# Patient Record
Sex: FEMALE | Race: WHITE | NOT HISPANIC OR LATINO | Employment: OTHER | URBAN - METROPOLITAN AREA
[De-identification: names, ages, dates, MRNs, and addresses within clinical notes are randomized per-mention and may not be internally consistent; named-entity substitution may affect disease eponyms.]

---

## 2017-01-27 ENCOUNTER — AMBULATORY - HEALTHEAST (OUTPATIENT)
Dept: HEALTH INFORMATION MANAGEMENT | Facility: CLINIC | Age: 82
End: 2017-01-27

## 2017-09-19 ENCOUNTER — RECORDS - HEALTHEAST (OUTPATIENT)
Dept: LAB | Facility: CLINIC | Age: 82
End: 2017-09-19

## 2017-09-19 LAB
CHOLEST SERPL-MCNC: 176 MG/DL
FASTING STATUS PATIENT QL REPORTED: ABNORMAL
HDLC SERPL-MCNC: 60 MG/DL
LDLC SERPL CALC-MCNC: 76 MG/DL
TRIGL SERPL-MCNC: 199 MG/DL

## 2018-06-15 ENCOUNTER — RECORDS - HEALTHEAST (OUTPATIENT)
Dept: LAB | Facility: CLINIC | Age: 83
End: 2018-06-15

## 2018-06-15 LAB — URATE SERPL-MCNC: 4.8 MG/DL (ref 2–7.5)

## 2019-03-18 ENCOUNTER — RECORDS - HEALTHEAST (OUTPATIENT)
Dept: LAB | Facility: CLINIC | Age: 84
End: 2019-03-18

## 2019-03-18 LAB
ANION GAP SERPL CALCULATED.3IONS-SCNC: 7 MMOL/L (ref 5–18)
BUN SERPL-MCNC: 19 MG/DL (ref 8–28)
CALCIUM SERPL-MCNC: 9.3 MG/DL (ref 8.5–10.5)
CHLORIDE BLD-SCNC: 106 MMOL/L (ref 98–107)
CHOLEST SERPL-MCNC: 175 MG/DL
CO2 SERPL-SCNC: 25 MMOL/L (ref 22–31)
CREAT SERPL-MCNC: 0.8 MG/DL (ref 0.6–1.1)
FASTING STATUS PATIENT QL REPORTED: ABNORMAL
GFR SERPL CREATININE-BSD FRML MDRD: >60 ML/MIN/1.73M2
GLUCOSE BLD-MCNC: 102 MG/DL (ref 70–125)
HDLC SERPL-MCNC: 59 MG/DL
LDLC SERPL CALC-MCNC: 82 MG/DL
POTASSIUM BLD-SCNC: 3.7 MMOL/L (ref 3.5–5)
SODIUM SERPL-SCNC: 138 MMOL/L (ref 136–145)
TRIGL SERPL-MCNC: 168 MG/DL

## 2019-04-05 ENCOUNTER — AMBULATORY - HEALTHEAST (OUTPATIENT)
Dept: NEUROLOGY | Facility: CLINIC | Age: 84
End: 2019-04-05

## 2019-04-05 DIAGNOSIS — G20.A1 PARKINSON DISEASE (H): ICD-10-CM

## 2019-05-23 ENCOUNTER — RECORDS - HEALTHEAST (OUTPATIENT)
Dept: ADMINISTRATIVE | Facility: OTHER | Age: 84
End: 2019-05-23

## 2019-05-23 ENCOUNTER — HOSPITAL ENCOUNTER (OUTPATIENT)
Dept: OCCUPATIONAL THERAPY | Age: 84
Setting detail: THERAPIES SERIES
Discharge: STILL ADMITTED OR ADMINISTRATIVE | End: 2019-05-23
Attending: PSYCHIATRY & NEUROLOGY

## 2019-05-23 ENCOUNTER — HOSPITAL ENCOUNTER (OUTPATIENT)
Dept: SPEECH THERAPY | Age: 84
Setting detail: THERAPIES SERIES
Discharge: STILL ADMITTED OR ADMINISTRATIVE | End: 2019-05-23
Attending: PSYCHIATRY & NEUROLOGY

## 2019-05-23 ENCOUNTER — HOSPITAL ENCOUNTER (OUTPATIENT)
Dept: PHYSICAL THERAPY | Age: 84
Setting detail: THERAPIES SERIES
Discharge: STILL ADMITTED OR ADMINISTRATIVE | End: 2019-05-23
Attending: PSYCHIATRY & NEUROLOGY

## 2019-05-23 DIAGNOSIS — Z78.9 IMPAIRED MOBILITY AND ADLS: ICD-10-CM

## 2019-05-23 DIAGNOSIS — Z74.09 DECREASED FUNCTIONAL MOBILITY AND ENDURANCE: ICD-10-CM

## 2019-05-23 DIAGNOSIS — R29.898 WEAKNESS OF BOTH LOWER EXTREMITIES: ICD-10-CM

## 2019-05-23 DIAGNOSIS — R25.8 BRADYKINESIA: ICD-10-CM

## 2019-05-23 DIAGNOSIS — R29.3 POSTURAL INSTABILITY: ICD-10-CM

## 2019-05-23 DIAGNOSIS — R68.89 DECREASED ACTIVITY TOLERANCE: ICD-10-CM

## 2019-05-23 DIAGNOSIS — R49.0 HYPOKINETIC PARKINSONIAN DYSPHONIA (H): ICD-10-CM

## 2019-05-23 DIAGNOSIS — R41.89 COGNITIVE IMPAIRMENT: ICD-10-CM

## 2019-05-23 DIAGNOSIS — G20.A1 HYPOKINETIC PARKINSONIAN DYSPHONIA (H): ICD-10-CM

## 2019-05-23 DIAGNOSIS — R26.81 GAIT INSTABILITY: ICD-10-CM

## 2019-05-23 DIAGNOSIS — R25.1 TREMOR: ICD-10-CM

## 2019-05-23 DIAGNOSIS — R27.9 LACK OF COORDINATION: ICD-10-CM

## 2019-05-23 DIAGNOSIS — Z74.09 IMPAIRED MOBILITY AND ADLS: ICD-10-CM

## 2019-06-04 ENCOUNTER — HOSPITAL ENCOUNTER (OUTPATIENT)
Dept: PHYSICAL THERAPY | Age: 84
Setting detail: THERAPIES SERIES
Discharge: STILL ADMITTED OR ADMINISTRATIVE | End: 2019-06-04
Attending: PSYCHIATRY & NEUROLOGY

## 2019-06-04 ENCOUNTER — HOSPITAL ENCOUNTER (OUTPATIENT)
Dept: SPEECH THERAPY | Age: 84
Setting detail: THERAPIES SERIES
Discharge: STILL ADMITTED OR ADMINISTRATIVE | End: 2019-06-04
Attending: PSYCHIATRY & NEUROLOGY

## 2019-06-04 DIAGNOSIS — R29.898 WEAKNESS OF BOTH LOWER EXTREMITIES: ICD-10-CM

## 2019-06-04 DIAGNOSIS — R29.3 POSTURAL INSTABILITY: ICD-10-CM

## 2019-06-04 DIAGNOSIS — G20.A1 HYPOKINETIC PARKINSONIAN DYSPHONIA (H): ICD-10-CM

## 2019-06-04 DIAGNOSIS — Z74.09 DECREASED FUNCTIONAL MOBILITY AND ENDURANCE: ICD-10-CM

## 2019-06-04 DIAGNOSIS — R25.8 BRADYKINESIA: ICD-10-CM

## 2019-06-04 DIAGNOSIS — R26.81 GAIT INSTABILITY: ICD-10-CM

## 2019-06-04 DIAGNOSIS — R49.0 HYPOKINETIC PARKINSONIAN DYSPHONIA (H): ICD-10-CM

## 2019-06-07 ENCOUNTER — HOSPITAL ENCOUNTER (OUTPATIENT)
Dept: SPEECH THERAPY | Age: 84
Setting detail: THERAPIES SERIES
Discharge: STILL ADMITTED OR ADMINISTRATIVE | End: 2019-06-07
Attending: PSYCHIATRY & NEUROLOGY

## 2019-06-07 ENCOUNTER — HOSPITAL ENCOUNTER (OUTPATIENT)
Dept: PHYSICAL THERAPY | Age: 84
Setting detail: THERAPIES SERIES
Discharge: STILL ADMITTED OR ADMINISTRATIVE | End: 2019-06-07
Attending: PSYCHIATRY & NEUROLOGY

## 2019-06-07 DIAGNOSIS — Z74.09 DECREASED FUNCTIONAL MOBILITY AND ENDURANCE: ICD-10-CM

## 2019-06-07 DIAGNOSIS — R29.898 WEAKNESS OF BOTH LOWER EXTREMITIES: ICD-10-CM

## 2019-06-07 DIAGNOSIS — G20.A1 HYPOKINETIC PARKINSONIAN DYSPHONIA (H): ICD-10-CM

## 2019-06-07 DIAGNOSIS — R26.81 GAIT INSTABILITY: ICD-10-CM

## 2019-06-07 DIAGNOSIS — R49.0 HYPOKINETIC PARKINSONIAN DYSPHONIA (H): ICD-10-CM

## 2019-06-07 DIAGNOSIS — R25.8 BRADYKINESIA: ICD-10-CM

## 2019-06-07 DIAGNOSIS — R29.3 POSTURAL INSTABILITY: ICD-10-CM

## 2019-06-11 ENCOUNTER — HOSPITAL ENCOUNTER (OUTPATIENT)
Dept: PHYSICAL THERAPY | Age: 84
Setting detail: THERAPIES SERIES
Discharge: STILL ADMITTED OR ADMINISTRATIVE | End: 2019-06-11
Attending: PSYCHIATRY & NEUROLOGY

## 2019-06-11 ENCOUNTER — HOSPITAL ENCOUNTER (OUTPATIENT)
Dept: SPEECH THERAPY | Age: 84
Setting detail: THERAPIES SERIES
Discharge: STILL ADMITTED OR ADMINISTRATIVE | End: 2019-06-11
Attending: PSYCHIATRY & NEUROLOGY

## 2019-06-11 DIAGNOSIS — R26.81 GAIT INSTABILITY: ICD-10-CM

## 2019-06-11 DIAGNOSIS — R29.3 POSTURAL INSTABILITY: ICD-10-CM

## 2019-06-11 DIAGNOSIS — Z74.09 DECREASED FUNCTIONAL MOBILITY AND ENDURANCE: ICD-10-CM

## 2019-06-11 DIAGNOSIS — R49.0 HYPOKINETIC PARKINSONIAN DYSPHONIA (H): ICD-10-CM

## 2019-06-11 DIAGNOSIS — G20.A1 HYPOKINETIC PARKINSONIAN DYSPHONIA (H): ICD-10-CM

## 2019-06-11 DIAGNOSIS — R25.8 BRADYKINESIA: ICD-10-CM

## 2019-06-18 ENCOUNTER — HOSPITAL ENCOUNTER (OUTPATIENT)
Dept: SPEECH THERAPY | Age: 84
Setting detail: THERAPIES SERIES
Discharge: STILL ADMITTED OR ADMINISTRATIVE | End: 2019-06-18
Attending: PSYCHIATRY & NEUROLOGY

## 2019-06-18 ENCOUNTER — HOSPITAL ENCOUNTER (OUTPATIENT)
Dept: PHYSICAL THERAPY | Age: 84
Setting detail: THERAPIES SERIES
Discharge: STILL ADMITTED OR ADMINISTRATIVE | End: 2019-06-18
Attending: PSYCHIATRY & NEUROLOGY

## 2019-06-18 DIAGNOSIS — Z74.09 DECREASED FUNCTIONAL MOBILITY AND ENDURANCE: ICD-10-CM

## 2019-06-18 DIAGNOSIS — R49.0 HYPOKINETIC PARKINSONIAN DYSPHONIA (H): ICD-10-CM

## 2019-06-18 DIAGNOSIS — G20.A1 HYPOKINETIC PARKINSONIAN DYSPHONIA (H): ICD-10-CM

## 2019-06-18 DIAGNOSIS — R25.8 BRADYKINESIA: ICD-10-CM

## 2019-06-18 DIAGNOSIS — R29.898 WEAKNESS OF BOTH LOWER EXTREMITIES: ICD-10-CM

## 2019-06-19 ENCOUNTER — HOSPITAL ENCOUNTER (OUTPATIENT)
Dept: PHYSICAL THERAPY | Age: 84
Setting detail: THERAPIES SERIES
Discharge: STILL ADMITTED OR ADMINISTRATIVE | End: 2019-06-19
Attending: PSYCHIATRY & NEUROLOGY

## 2019-06-19 DIAGNOSIS — R25.8 BRADYKINESIA: ICD-10-CM

## 2019-06-19 DIAGNOSIS — R29.3 POSTURAL INSTABILITY: ICD-10-CM

## 2019-06-19 DIAGNOSIS — R26.81 GAIT INSTABILITY: ICD-10-CM

## 2019-06-21 ENCOUNTER — HOSPITAL ENCOUNTER (OUTPATIENT)
Dept: SPEECH THERAPY | Age: 84
Setting detail: THERAPIES SERIES
Discharge: STILL ADMITTED OR ADMINISTRATIVE | End: 2019-06-21
Attending: PSYCHIATRY & NEUROLOGY

## 2019-06-21 DIAGNOSIS — R49.0 HYPOKINETIC PARKINSONIAN DYSPHONIA (H): ICD-10-CM

## 2019-06-21 DIAGNOSIS — G20.A1 HYPOKINETIC PARKINSONIAN DYSPHONIA (H): ICD-10-CM

## 2019-06-25 ENCOUNTER — HOSPITAL ENCOUNTER (OUTPATIENT)
Dept: PHYSICAL THERAPY | Age: 84
Setting detail: THERAPIES SERIES
Discharge: STILL ADMITTED OR ADMINISTRATIVE | End: 2019-06-25
Attending: PSYCHIATRY & NEUROLOGY

## 2019-06-25 ENCOUNTER — HOSPITAL ENCOUNTER (OUTPATIENT)
Dept: SPEECH THERAPY | Age: 84
Setting detail: THERAPIES SERIES
Discharge: STILL ADMITTED OR ADMINISTRATIVE | End: 2019-06-25
Attending: PSYCHIATRY & NEUROLOGY

## 2019-06-25 DIAGNOSIS — R29.3 POSTURAL INSTABILITY: ICD-10-CM

## 2019-06-25 DIAGNOSIS — Z74.09 DECREASED FUNCTIONAL MOBILITY AND ENDURANCE: ICD-10-CM

## 2019-06-25 DIAGNOSIS — R49.0 HYPOKINETIC PARKINSONIAN DYSPHONIA (H): ICD-10-CM

## 2019-06-25 DIAGNOSIS — R29.898 WEAKNESS OF BOTH LOWER EXTREMITIES: ICD-10-CM

## 2019-06-25 DIAGNOSIS — R25.8 BRADYKINESIA: ICD-10-CM

## 2019-06-25 DIAGNOSIS — G20.A1 HYPOKINETIC PARKINSONIAN DYSPHONIA (H): ICD-10-CM

## 2019-06-25 DIAGNOSIS — R26.81 GAIT INSTABILITY: ICD-10-CM

## 2019-06-27 ENCOUNTER — HOSPITAL ENCOUNTER (OUTPATIENT)
Dept: PHYSICAL THERAPY | Age: 84
Setting detail: THERAPIES SERIES
Discharge: STILL ADMITTED OR ADMINISTRATIVE | End: 2019-06-27
Attending: PSYCHIATRY & NEUROLOGY

## 2019-06-27 DIAGNOSIS — R29.3 POSTURAL INSTABILITY: ICD-10-CM

## 2019-06-27 DIAGNOSIS — R25.8 BRADYKINESIA: ICD-10-CM

## 2019-06-27 DIAGNOSIS — R29.898 WEAKNESS OF BOTH LOWER EXTREMITIES: ICD-10-CM

## 2019-06-27 DIAGNOSIS — Z74.09 DECREASED FUNCTIONAL MOBILITY AND ENDURANCE: ICD-10-CM

## 2019-06-27 DIAGNOSIS — R26.81 GAIT INSTABILITY: ICD-10-CM

## 2019-07-02 ENCOUNTER — HOSPITAL ENCOUNTER (OUTPATIENT)
Dept: PHYSICAL THERAPY | Age: 84
Setting detail: THERAPIES SERIES
Discharge: STILL ADMITTED OR ADMINISTRATIVE | End: 2019-07-02
Attending: PSYCHIATRY & NEUROLOGY

## 2019-07-02 ENCOUNTER — HOSPITAL ENCOUNTER (OUTPATIENT)
Dept: SPEECH THERAPY | Age: 84
Setting detail: THERAPIES SERIES
Discharge: STILL ADMITTED OR ADMINISTRATIVE | End: 2019-07-02
Attending: PSYCHIATRY & NEUROLOGY

## 2019-07-02 DIAGNOSIS — Z74.09 DECREASED FUNCTIONAL MOBILITY AND ENDURANCE: ICD-10-CM

## 2019-07-02 DIAGNOSIS — R26.81 GAIT INSTABILITY: ICD-10-CM

## 2019-07-02 DIAGNOSIS — R25.8 BRADYKINESIA: ICD-10-CM

## 2019-07-02 DIAGNOSIS — G20.A1 HYPOKINETIC PARKINSONIAN DYSPHONIA (H): ICD-10-CM

## 2019-07-02 DIAGNOSIS — R29.3 POSTURAL INSTABILITY: ICD-10-CM

## 2019-07-02 DIAGNOSIS — R29.898 WEAKNESS OF BOTH LOWER EXTREMITIES: ICD-10-CM

## 2019-07-02 DIAGNOSIS — R49.0 HYPOKINETIC PARKINSONIAN DYSPHONIA (H): ICD-10-CM

## 2019-07-09 ENCOUNTER — HOSPITAL ENCOUNTER (OUTPATIENT)
Dept: SPEECH THERAPY | Age: 84
Setting detail: THERAPIES SERIES
Discharge: STILL ADMITTED OR ADMINISTRATIVE | End: 2019-07-09
Attending: PSYCHIATRY & NEUROLOGY

## 2019-07-09 DIAGNOSIS — G20.A1 HYPOKINETIC PARKINSONIAN DYSPHONIA (H): ICD-10-CM

## 2019-07-09 DIAGNOSIS — R49.0 HYPOKINETIC PARKINSONIAN DYSPHONIA (H): ICD-10-CM

## 2019-07-23 ENCOUNTER — HOSPITAL ENCOUNTER (OUTPATIENT)
Dept: PHYSICAL THERAPY | Age: 84
Setting detail: THERAPIES SERIES
Discharge: STILL ADMITTED OR ADMINISTRATIVE | End: 2019-07-23
Attending: PSYCHIATRY & NEUROLOGY

## 2019-07-23 DIAGNOSIS — R25.8 BRADYKINESIA: ICD-10-CM

## 2019-07-23 DIAGNOSIS — R29.898 WEAKNESS OF BOTH LOWER EXTREMITIES: ICD-10-CM

## 2019-07-23 DIAGNOSIS — R26.81 GAIT INSTABILITY: ICD-10-CM

## 2019-07-23 DIAGNOSIS — Z74.09 DECREASED FUNCTIONAL MOBILITY AND ENDURANCE: ICD-10-CM

## 2019-07-23 DIAGNOSIS — R29.3 POSTURAL INSTABILITY: ICD-10-CM

## 2019-08-01 ENCOUNTER — HOSPITAL ENCOUNTER (OUTPATIENT)
Dept: PHYSICAL THERAPY | Age: 84
Setting detail: THERAPIES SERIES
Discharge: STILL ADMITTED OR ADMINISTRATIVE | End: 2019-08-01
Attending: PSYCHIATRY & NEUROLOGY

## 2019-08-01 DIAGNOSIS — Z74.09 DECREASED FUNCTIONAL MOBILITY AND ENDURANCE: ICD-10-CM

## 2019-08-01 DIAGNOSIS — R25.8 BRADYKINESIA: ICD-10-CM

## 2019-08-01 DIAGNOSIS — R29.3 POSTURAL INSTABILITY: ICD-10-CM

## 2019-08-01 DIAGNOSIS — R26.81 GAIT INSTABILITY: ICD-10-CM

## 2019-08-01 DIAGNOSIS — R29.898 WEAKNESS OF BOTH LOWER EXTREMITIES: ICD-10-CM

## 2019-08-06 ENCOUNTER — HOSPITAL ENCOUNTER (OUTPATIENT)
Dept: PHYSICAL THERAPY | Age: 84
Setting detail: THERAPIES SERIES
Discharge: STILL ADMITTED OR ADMINISTRATIVE | End: 2019-08-06
Attending: PSYCHIATRY & NEUROLOGY

## 2019-08-06 DIAGNOSIS — R26.81 GAIT INSTABILITY: ICD-10-CM

## 2019-08-06 DIAGNOSIS — Z74.09 DECREASED FUNCTIONAL MOBILITY AND ENDURANCE: ICD-10-CM

## 2019-08-06 DIAGNOSIS — R29.3 POSTURAL INSTABILITY: ICD-10-CM

## 2019-08-06 DIAGNOSIS — R29.898 WEAKNESS OF BOTH LOWER EXTREMITIES: ICD-10-CM

## 2019-08-06 DIAGNOSIS — R25.8 BRADYKINESIA: ICD-10-CM

## 2019-08-13 ENCOUNTER — HOSPITAL ENCOUNTER (OUTPATIENT)
Dept: PHYSICAL THERAPY | Age: 84
Setting detail: THERAPIES SERIES
Discharge: STILL ADMITTED OR ADMINISTRATIVE | End: 2019-08-13
Attending: PSYCHIATRY & NEUROLOGY

## 2019-08-13 DIAGNOSIS — Z74.09 DECREASED FUNCTIONAL MOBILITY AND ENDURANCE: ICD-10-CM

## 2019-08-13 DIAGNOSIS — R25.8 BRADYKINESIA: ICD-10-CM

## 2019-08-13 DIAGNOSIS — R29.3 POSTURAL INSTABILITY: ICD-10-CM

## 2019-08-13 DIAGNOSIS — R29.898 WEAKNESS OF BOTH LOWER EXTREMITIES: ICD-10-CM

## 2019-08-13 DIAGNOSIS — R26.81 GAIT INSTABILITY: ICD-10-CM

## 2019-08-20 ENCOUNTER — HOSPITAL ENCOUNTER (OUTPATIENT)
Dept: PHYSICAL THERAPY | Age: 84
Setting detail: THERAPIES SERIES
Discharge: STILL ADMITTED OR ADMINISTRATIVE | End: 2019-08-20
Attending: PSYCHIATRY & NEUROLOGY

## 2019-08-20 DIAGNOSIS — R26.81 GAIT INSTABILITY: ICD-10-CM

## 2019-08-20 DIAGNOSIS — R29.898 WEAKNESS OF BOTH LOWER EXTREMITIES: ICD-10-CM

## 2019-08-20 DIAGNOSIS — R29.3 POSTURAL INSTABILITY: ICD-10-CM

## 2019-08-20 DIAGNOSIS — R25.8 BRADYKINESIA: ICD-10-CM

## 2019-08-27 ENCOUNTER — HOSPITAL ENCOUNTER (OUTPATIENT)
Dept: PHYSICAL THERAPY | Age: 84
Setting detail: THERAPIES SERIES
Discharge: STILL ADMITTED OR ADMINISTRATIVE | End: 2019-08-27
Attending: PSYCHIATRY & NEUROLOGY

## 2019-08-27 DIAGNOSIS — R26.81 GAIT INSTABILITY: ICD-10-CM

## 2019-08-27 DIAGNOSIS — Z74.09 DECREASED FUNCTIONAL MOBILITY AND ENDURANCE: ICD-10-CM

## 2019-08-27 DIAGNOSIS — R29.3 POSTURAL INSTABILITY: ICD-10-CM

## 2019-08-27 DIAGNOSIS — R25.8 BRADYKINESIA: ICD-10-CM

## 2019-08-27 DIAGNOSIS — R29.898 WEAKNESS OF BOTH LOWER EXTREMITIES: ICD-10-CM

## 2019-09-03 ENCOUNTER — HOSPITAL ENCOUNTER (OUTPATIENT)
Dept: PHYSICAL THERAPY | Age: 84
Setting detail: THERAPIES SERIES
Discharge: STILL ADMITTED OR ADMINISTRATIVE | End: 2019-09-03
Attending: PSYCHIATRY & NEUROLOGY

## 2019-09-03 DIAGNOSIS — Z74.09 DECREASED FUNCTIONAL MOBILITY AND ENDURANCE: ICD-10-CM

## 2019-09-03 DIAGNOSIS — R29.898 WEAKNESS OF BOTH LOWER EXTREMITIES: ICD-10-CM

## 2019-09-03 DIAGNOSIS — R25.8 BRADYKINESIA: ICD-10-CM

## 2019-09-03 DIAGNOSIS — R29.3 POSTURAL INSTABILITY: ICD-10-CM

## 2019-09-10 ENCOUNTER — HOSPITAL ENCOUNTER (OUTPATIENT)
Dept: PHYSICAL THERAPY | Age: 84
Setting detail: THERAPIES SERIES
Discharge: STILL ADMITTED OR ADMINISTRATIVE | End: 2019-09-10
Attending: PSYCHIATRY & NEUROLOGY

## 2019-09-10 DIAGNOSIS — R29.898 WEAKNESS OF BOTH LOWER EXTREMITIES: ICD-10-CM

## 2019-09-10 DIAGNOSIS — R26.81 GAIT INSTABILITY: ICD-10-CM

## 2019-09-10 DIAGNOSIS — R29.3 POSTURAL INSTABILITY: ICD-10-CM

## 2019-09-10 DIAGNOSIS — Z74.09 DECREASED FUNCTIONAL MOBILITY AND ENDURANCE: ICD-10-CM

## 2019-09-10 DIAGNOSIS — R25.8 BRADYKINESIA: ICD-10-CM

## 2020-01-13 ENCOUNTER — AMBULATORY - HEALTHEAST (OUTPATIENT)
Dept: OCCUPATIONAL THERAPY | Age: 85
End: 2020-01-13

## 2020-02-05 ENCOUNTER — AMBULATORY - HEALTHEAST (OUTPATIENT)
Dept: NEUROLOGY | Facility: CLINIC | Age: 85
End: 2020-02-05

## 2020-02-05 DIAGNOSIS — G20.A1 PARKINSON DISEASE (H): ICD-10-CM

## 2020-05-05 ENCOUNTER — RECORDS - HEALTHEAST (OUTPATIENT)
Dept: LAB | Facility: CLINIC | Age: 85
End: 2020-05-05

## 2020-05-05 LAB
ANION GAP SERPL CALCULATED.3IONS-SCNC: 10 MMOL/L (ref 5–18)
BUN SERPL-MCNC: 17 MG/DL (ref 8–28)
CALCIUM SERPL-MCNC: 9.2 MG/DL (ref 8.5–10.5)
CHLORIDE BLD-SCNC: 108 MMOL/L (ref 98–107)
CHOLEST SERPL-MCNC: 161 MG/DL
CO2 SERPL-SCNC: 24 MMOL/L (ref 22–31)
CREAT SERPL-MCNC: 0.74 MG/DL (ref 0.6–1.1)
FASTING STATUS PATIENT QL REPORTED: ABNORMAL
GFR SERPL CREATININE-BSD FRML MDRD: >60 ML/MIN/1.73M2
GLUCOSE BLD-MCNC: 101 MG/DL (ref 70–125)
HDLC SERPL-MCNC: 56 MG/DL
LDLC SERPL CALC-MCNC: 65 MG/DL
POTASSIUM BLD-SCNC: 3.8 MMOL/L (ref 3.5–5)
SODIUM SERPL-SCNC: 142 MMOL/L (ref 136–145)
TRIGL SERPL-MCNC: 202 MG/DL

## 2020-07-01 ENCOUNTER — TELEPHONE (OUTPATIENT)
Dept: NEUROLOGY | Facility: CLINIC | Age: 85
End: 2020-07-01

## 2020-09-07 ENCOUNTER — RECORDS - HEALTHEAST (OUTPATIENT)
Dept: LAB | Facility: CLINIC | Age: 85
End: 2020-09-07

## 2020-09-09 LAB
BACTERIA SPEC CULT: ABNORMAL
BACTERIA SPEC CULT: ABNORMAL

## 2020-09-30 ENCOUNTER — OFFICE VISIT (OUTPATIENT)
Dept: NEUROLOGY | Facility: CLINIC | Age: 85
End: 2020-09-30
Payer: MEDICARE

## 2020-09-30 VITALS — HEIGHT: 67 IN | WEIGHT: 200 LBS | BODY MASS INDEX: 31.39 KG/M2

## 2020-09-30 DIAGNOSIS — G20.A1 PARKINSON'S DISEASE (H): Primary | ICD-10-CM

## 2020-09-30 PROBLEM — R42 DIZZINESS ON STANDING: Status: ACTIVE | Noted: 2020-09-30

## 2020-09-30 PROBLEM — F02.80 DEMENTIA DUE TO GENERAL MEDICAL CONDITION (H): Status: ACTIVE | Noted: 2020-09-30

## 2020-09-30 PROBLEM — G47.52 REM SLEEP BEHAVIOR DISORDER: Status: ACTIVE | Noted: 2020-09-30

## 2020-09-30 PROBLEM — H81.10 BENIGN PAROXYSMAL POSITIONAL VERTIGO: Status: ACTIVE | Noted: 2020-09-30

## 2020-09-30 PROBLEM — E55.9 VITAMIN D DEFICIENCY DISEASE: Status: ACTIVE | Noted: 2020-09-30

## 2020-09-30 PROBLEM — S84.90XA: Status: ACTIVE | Noted: 2020-09-30

## 2020-09-30 PROBLEM — M79.604 PAIN OF RIGHT LOWER EXTREMITY: Status: ACTIVE | Noted: 2020-09-30

## 2020-09-30 PROBLEM — M15.0 PRIMARY OSTEOARTHRITIS INVOLVING MULTIPLE JOINTS: Status: ACTIVE | Noted: 2020-09-30

## 2020-09-30 PROBLEM — F06.4 ANXIETY DISORDER DUE TO GENERAL MEDICAL CONDITION: Status: ACTIVE | Noted: 2020-09-30

## 2020-09-30 PROBLEM — E78.5 HYPERLIPIDEMIA: Status: ACTIVE | Noted: 2020-09-30

## 2020-09-30 PROBLEM — N39.3 STRESS BLADDER INCONTINENCE, FEMALE: Status: ACTIVE | Noted: 2020-09-30

## 2020-09-30 PROBLEM — I10 ESSENTIAL HYPERTENSION: Status: ACTIVE | Noted: 2020-09-30

## 2020-09-30 PROBLEM — G47.01 INSOMNIA DUE TO MEDICAL CONDITION: Status: ACTIVE | Noted: 2020-09-30

## 2020-09-30 PROBLEM — G89.18 ACUTE POST-OPERATIVE PAIN: Status: ACTIVE | Noted: 2020-09-30

## 2020-09-30 PROCEDURE — 99442 ZZC PHYSICIAN TELEPHONE EVALUATION 11-20 MIN: CPT | Performed by: PSYCHIATRY & NEUROLOGY

## 2020-09-30 RX ORDER — HYDROCHLOROTHIAZIDE 12.5 MG/1
CAPSULE ORAL
COMMUNITY
Start: 2020-09-22 | End: 2021-09-21 | Stop reason: CLARIF

## 2020-09-30 RX ORDER — SIMVASTATIN 40 MG
40 TABLET ORAL AT BEDTIME
COMMUNITY
Start: 2020-07-30

## 2020-09-30 RX ORDER — ATENOLOL 25 MG/1
TABLET ORAL
COMMUNITY
Start: 2020-07-23 | End: 2021-09-21

## 2020-09-30 RX ORDER — IBUPROFEN 200 MG/1
TABLET, FILM COATED ORAL
COMMUNITY
Start: 2020-09-15 | End: 2021-09-21 | Stop reason: CLARIF

## 2020-09-30 RX ORDER — DONEPEZIL HYDROCHLORIDE 10 MG/1
10 TABLET, FILM COATED ORAL AT BEDTIME
COMMUNITY
Start: 2020-09-22

## 2020-09-30 RX ORDER — PSEUDOEPHED/ACETAMINOPH/DIPHEN 30MG-500MG
TABLET ORAL
COMMUNITY
Start: 2020-09-15 | End: 2021-09-21 | Stop reason: CLARIF

## 2020-09-30 RX ORDER — CARBIDOPA AND LEVODOPA 25; 100 MG/1; MG/1
TABLET ORAL
COMMUNITY
Start: 2020-08-09

## 2020-09-30 RX ORDER — ANORECTAL OINTMENT 15.7; .44; 24; 20.6 G/100G; G/100G; G/100G; G/100G
OINTMENT TOPICAL AT BEDTIME
COMMUNITY
Start: 2020-09-15

## 2020-09-30 RX ORDER — STANDARDIZED SENNA CONCENTRATE AND DOCUSATE SODIUM 8.6; 5 MG/1; MG/1
1 TABLET ORAL 2 TIMES DAILY PRN
COMMUNITY
Start: 2020-09-15

## 2020-09-30 RX ORDER — VIT A/VIT C/VIT E/ZINC/COPPER 2148-113
1 TABLET ORAL 2 TIMES DAILY
COMMUNITY
Start: 2020-09-28

## 2020-09-30 RX ORDER — CHOLECALCIFEROL (VITAMIN D3) 50 MCG
2000 TABLET ORAL DAILY
COMMUNITY

## 2020-09-30 RX ORDER — MELATONIN 200 MCG
3 TABLET ORAL AT BEDTIME
COMMUNITY
Start: 2020-09-17

## 2020-09-30 SDOH — HEALTH STABILITY: MENTAL HEALTH: HOW OFTEN DO YOU HAVE A DRINK CONTAINING ALCOHOL?: NEVER

## 2020-09-30 ASSESSMENT — MIFFLIN-ST. JEOR: SCORE: 1369.82

## 2020-09-30 NOTE — LETTER
9/30/2020         RE: Diane Urbina  [facility]  [address]  [address]  [place] [address]        Dear Colleague,    Thank you for referring your patient, Diane Urbina, to the [facility] NEUROLOGY [place]. Please see a copy of my visit note below.        NEUROLOGY NOTE        Assessment/Plan          Parkinson disease: Continue carbidopa levodopa 25/100, to 1 tab tid, doing well. Plan to see her back here in about 3-4 month.  Had therapies about 2 months ago.  No falls.    Orthostatic hypotension. doing well. Keep up with fluid intake.     Mild dementia: Off Aricept, not able to tolerated, sleeping better. Not interested in other med for this. B12, CBC, BMP, ionized Ca are ok.    Asymptomatic CBC lacunar ischemic stroke: recommended ASA 81 mg daily and continue simvastatin.     REM sleep disorder. Didn't try Clonazepam 0.5 mg hs. Ok to try Melatonin.    [place]: mild degree.    Right ankle edema and pain. Hope therapy can help.     Plan:  Continue current treatment from neurological perspective  Follow-up in about 3 to 4 months.    This is a telephone visit due to COVID-19 Pandemic to mitigate potential disease spreading. Consent to charge obtained for call visit. Total time spent about 15 minutes.  Not able to get a hold of her daughter or nursing staff.  But patient was able to give proper history.  Talk to patient and her .  Her nurse or assistant in her room refused to talk to us.           SUBJECTIVE         Came with , who is 5 years older, but still doing well and her care giver. They met when they were 20 and 15.  They are now living in assisted living.  Due to the COVID-19 were not able to get a hold of her daughter or supervisor.  Nursing staff are administering medication.  She believes she is taking carbidopa levodopa 1 tablets instead of half tablet each time.  Occasionally dizzy but no falls.  No syncope.    PD: doing better on Sinemet 25/100, 1/2 tab at 6 am, 11  am according to our previous chart but she reports she is getting 1 tablets each time.  Not able to get a hold of her daughter or nursing staff or supervisor., Off Sinemet CR HS. No hallucination.  Was on higher dose, but too much dreams and feeling no need of higher dose.    Right ankle pain, in calm worse with ankle edema in the past, no complain at this time. No sensory of motor difficulty.    Therapies: Therapy about 2 months ago.. Not exercise enough.    Balance poor: rare falls, some pain in legs with ambulation.    Memory: no changes. MOCA 19/30 9/2016. Off Aricept, not able to tolerated, sleeping better. Not interested in other med for this.    Psych: no problem. But had hallucination after surgery with pain medications.    Sleep: disrupted with restroom visit. Lots of dreams, and sometime moving too much with the dreams. Feeling heavy and uncomfortable in legs, needs to change places. Not interested trying medications for this.    Falls: rare.    Bilateral hip and leg pain: Sitting and laying fine. Had right hip fracture and surgery 10/2016. Had left hip surgery in 7/2016, helped the hip pain. 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 Also has a lot knee pain limiting function.    ADL: use a walker for long distance. Needs assistance from . No driving.    Autonomic: occasionally light headed if stand up too fast.    No family history of PD.    PD features reviewed, treatment options discussed:  Diagnosed with PD 9/2016 initial visit with me. Good Sinemet response. Right hand tremor and in right leg, resting tremor started in early 2016. Worse when anxious. Right handed, the hand writing become smaller, speech is soft. Coordination decreased, small and shuffling gait. slightly stooped over posture. All better than last visit. Right hand resting tremor better.        "        Review of system     10 point system review otherwise unremarkable    PHYSICAL EXAMINATION     Vital signs in last 24 hours:  Vitals:    09/30/20 1051   Weight: 90.7 kg (200 lb)   Height: 1.702 m (5' 7\")           This is a telephone visit during the pandemic       Problem List     Patient Active Problem List    Diagnosis Date Noted     Vitamin D deficiency disease 09/30/2020     Priority: Medium     Stress bladder incontinence, female 09/30/2020     Priority: Medium     REM sleep behavior disorder 09/30/2020     Priority: Medium     Primary osteoarthritis involving multiple joints 09/30/2020     Priority: Medium     Parkinson's disease (H) 09/30/2020     Priority: Medium     Pain of right lower extremity 09/30/2020     Priority: Medium     Insomnia due to medical condition 09/30/2020     Priority: Medium     Injury of nerve of lower extremity 09/30/2020     Priority: Medium     Hyperlipidemia 09/30/2020     Priority: Medium     Dizziness on standing 09/30/2020     Priority: Medium     Essential hypertension 09/30/2020     Priority: Medium     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 No pertinent surgical history.    History reviewed. No pertinent past medical history.        Family history     Family History   Problem Relation Age of Onset     Cancer Mother      Cancer Father          Social history     Social History     Socioeconomic History     Marital status: "      Spouse name: Not on file     Number of children: Not on file     Years of education: Not on file     Highest education level: Not on file   Occupational History     Not on file   Social Needs     Financial resource strain: Not on file     Food insecurity     Worry: Not on file     Inability: Not on file     Transportation needs     Medical: Not on file     Non-medical: Not on file   Tobacco Use     Smoking status: Never Smoker     Smokeless tobacco: Never Used   Substance and Sexual Activity     Alcohol use: Never     Frequency: Never     Drug use: Not on file     Sexual activity: Not on file   Lifestyle     Physical activity     Days per week: Not on file     Minutes per session: Not on file     Stress: Not on file   Relationships     Social connections     Talks on phone: Not on file     Gets together: Not on file     Attends Scientologist service: Not on file     Active member of club or organization: Not on file     Attends meetings of clubs or organizations: Not on file     Relationship status: Not on file     Intimate partner violence     Fear of current or ex partner: Not on file     Emotionally abused: Not on file     Physically abused: Not on file     Forced sexual activity: Not on file   Other Topics Concern     Parent/sibling w/ CABG, MI or angioplasty before 65F 55M? Not Asked   Social History Narrative     Not on file         Allergy     Advair diskus; Atorvastatin; Cephalosporins; Lisinopril; Morphine; Oxycodone; Penicillins; Sulfa drugs; and Tramadol    MEDICATIONS List     Current Outpatient Medications   Medication Sig Dispense Refill     ACETAMINOPHEN EXTRA STRENGTH 500 MG tablet TAKE 1-2 TABS (500-1,000mg) BY MOUTH THREE TIMES DAILY AS NEEDED       atenolol (TENORMIN) 25 MG tablet 1 TAB(S) ONCE A DAY FOR HIGH BLOOD PRESSURE ORALLY 90       CALMOSEPTINE 0.44-20.6 % OINT ointment APPLY TO AFFECTED AREA(S) DAILY AS DIRECTED       carbidopa-levodopa (SINEMET)  MG tablet TAKE 1 TABLET BY  MOUTH THREE TIMES A DAY       Cholecalciferol (VITAMIN D3) 25 MCG (1000 UT) CAPS Take 2,000 Units by mouth       donepezil (ARICEPT) 10 MG tablet TAKE 1 TAB BY MOUTH AT BEDTIME       hydrochlorothiazide (MICROZIDE) 12.5 MG capsule TAKE 1 CAP BY MOUTH ONCE DAILY       Melatonin 3 MG SUBL DISSOLVE 1 TAB BY MOUTH ONCE DAILY AT BEDTIME       Multiple Vitamins-Minerals (PRESERVISION AREDS) TABS        omeprazole (PRILOSEC) 20 MG DR capsule TAKE 1 CAPSULE BY MOUTH EVERY DAY FOR HEARTBURN       SENOKOT S 8.6-50 MG tablet TAKE 1 TAB BY MOUTH TWICE DAILY AS NEEDED FOR CONSTIPATION       simvastatin (ZOCOR) 40 MG tablet TAKE 1 TABLET BY MOUTH ONCE A DAY (AT BEDTIME) FOR CHOLESTEROL FOR 90 DAYS       SM IBUPROFEN 200 MG tablet TAKE 1-2 TABLETS (200-400MG) BY MOUTH EVERY 4-6 HOURS AS NEEDED DO NOT EXCEED 1200MG/DAY                   Deana Lane MD, MD, PhD  Neurology   Office tel: [phone]        Again, thank you for allowing me to participate in the care of your patient.        Sincerely,        Deana Lane MD

## 2020-09-30 NOTE — PROGRESS NOTES
NEUROLOGY NOTE        Assessment/Plan          Parkinson disease: Continue carbidopa levodopa 25/100, to 1 tab tid, doing well. Plan to see her back here in about 3-4 month.  Had therapies about 2 months ago.  No falls.    Orthostatic hypotension. doing well. Keep up with fluid intake.     Mild dementia: Off Aricept, not able to tolerated, sleeping better. Not interested in other med for this. B12, CBC, BMP, ionized Ca are ok.    Asymptomatic CBC lacunar ischemic stroke: recommended ASA 81 mg daily and continue simvastatin.     REM sleep disorder. Didn't try Clonazepam 0.5 mg hs. Ok to try Melatonin.    [place]: mild degree.    Right ankle edema and pain. Hope therapy can help.     Plan:  Continue current treatment from neurological perspective  Follow-up in about 3 to 4 months.    This is a telephone visit due to COVID-19 Pandemic to mitigate potential disease spreading. Consent to charge obtained for call visit. Total time spent about 15 minutes.  Not able to get a hold of her daughter or nursing staff.  But patient was able to give proper history.  Talk to patient and her .  Her nurse or assistant in her room refused to talk to us.           SUBJECTIVE         Came with , who is 5 years older, but still doing well and her care giver. They met when they were 20 and 15.  They are now living in assisted living.  Due to the COVID-19 were not able to get a hold of her daughter or supervisor.  Nursing staff are administering medication.  She believes she is taking carbidopa levodopa 1 tablets instead of half tablet each time.  Occasionally dizzy but no falls.  No syncope.    PD: doing better on Sinemet 25/100, 1/2 tab at 6 am, 11 am according to our previous chart but she reports she is getting 1 tablets each time.  Not able to get a hold of her daughter or nursing staff or supervisor., Off Sinemet CR HS. No hallucination.  Was on higher dose, but too much dreams and feeling no need of higher  "dose.    Right ankle pain, in calm worse with ankle edema in the past, no complain at this time. No sensory of motor difficulty.    Therapies: Therapy about 2 months ago.. Not exercise enough.    Balance poor: rare falls, some pain in legs with ambulation.    Memory: no changes. MOCA 19/30 9/2016. Off Aricept, not able to tolerated, sleeping better. Not interested in other med for this.    Psych: no problem. But had hallucination after surgery with pain medications.    Sleep: disrupted with restroom visit. Lots of dreams, and sometime moving too much with the dreams. Feeling heavy and uncomfortable in legs, needs to change places. Not interested trying medications for this.    Falls: rare.    Bilateral hip and leg pain: Sitting and laying fine. Had right hip fracture and surgery 10/2016. Had left hip surgery in 7/2016, helped the hip pain. 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 Also has a lot knee pain limiting function.    ADL: use a walker for long distance. Needs assistance from . No driving.    Autonomic: occasionally light headed if stand up too fast.    No family history of PD.    PD features reviewed, treatment options discussed:  Diagnosed with PD 9/2016 initial visit with me. Good Sinemet response. Right hand tremor and in right leg, resting tremor started in early 2016. Worse when anxious. Right handed, the hand writing become smaller, speech is soft. Coordination decreased, small and shuffling gait. slightly stooped over posture. All better than last visit. Right hand resting tremor better.               Review of system     10 point system review otherwise unremarkable    PHYSICAL EXAMINATION     Vital signs in last 24 hours:  Vitals:    09/30/20 1051   Weight: 90.7 kg (200 lb)   Height: 1.702 m (5' 7\")           This is a telephone visit during the pandemic "       Problem List     Patient Active Problem List    Diagnosis Date Noted     Vitamin D deficiency disease 09/30/2020     Priority: Medium     Stress bladder incontinence, female 09/30/2020     Priority: Medium     REM sleep behavior disorder 09/30/2020     Priority: Medium     Primary osteoarthritis involving multiple joints 09/30/2020     Priority: Medium     Parkinson's disease (H) 09/30/2020     Priority: Medium     Pain of right lower extremity 09/30/2020     Priority: Medium     Insomnia due to medical condition 09/30/2020     Priority: Medium     Injury of nerve of lower extremity 09/30/2020     Priority: Medium     Hyperlipidemia 09/30/2020     Priority: Medium     Dizziness on standing 09/30/2020     Priority: Medium     Essential hypertension 09/30/2020     Priority: Medium     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 No pertinent surgical history.    History reviewed. No pertinent past medical history.        Family history     Family History   Problem Relation Age of Onset     Cancer Mother      Cancer Father          Social history     Social History     Socioeconomic History     Marital status:      Spouse name: Not on file     Number of children: Not on file     Years of education: Not on file     Highest education level: Not on file   Occupational History     Not on file   Social Needs     Financial resource strain: Not on file     Food insecurity      Worry: Not on file     Inability: Not on file     Transportation needs     Medical: Not on file     Non-medical: Not on file   Tobacco Use     Smoking status: Never Smoker     Smokeless tobacco: Never Used   Substance and Sexual Activity     Alcohol use: Never     Frequency: Never     Drug use: Not on file     Sexual activity: Not on file   Lifestyle     Physical activity     Days per week: Not on file     Minutes per session: Not on file     Stress: Not on file   Relationships     Social connections     Talks on phone: Not on file     Gets together: Not on file     Attends Buddhism service: Not on file     Active member of club or organization: Not on file     Attends meetings of clubs or organizations: Not on file     Relationship status: Not on file     Intimate partner violence     Fear of current or ex partner: Not on file     Emotionally abused: Not on file     Physically abused: Not on file     Forced sexual activity: Not on file   Other Topics Concern     Parent/sibling w/ CABG, MI or angioplasty before 65F 55M? Not Asked   Social History Narrative     Not on file         Allergy     Advair diskus; Atorvastatin; Cephalosporins; Lisinopril; Morphine; Oxycodone; Penicillins; Sulfa drugs; and Tramadol    MEDICATIONS List     Current Outpatient Medications   Medication Sig Dispense Refill     ACETAMINOPHEN EXTRA STRENGTH 500 MG tablet TAKE 1-2 TABS (500-1,000mg) BY MOUTH THREE TIMES DAILY AS NEEDED       atenolol (TENORMIN) 25 MG tablet 1 TAB(S) ONCE A DAY FOR HIGH BLOOD PRESSURE ORALLY 90       CALMOSEPTINE 0.44-20.6 % OINT ointment APPLY TO AFFECTED AREA(S) DAILY AS DIRECTED       carbidopa-levodopa (SINEMET)  MG tablet TAKE 1 TABLET BY MOUTH THREE TIMES A DAY       Cholecalciferol (VITAMIN D3) 25 MCG (1000 UT) CAPS Take 2,000 Units by mouth       donepezil (ARICEPT) 10 MG tablet TAKE 1 TAB BY MOUTH AT BEDTIME       hydrochlorothiazide (MICROZIDE) 12.5 MG capsule TAKE 1 CAP BY MOUTH ONCE DAILY        Melatonin 3 MG SUBL DISSOLVE 1 TAB BY MOUTH ONCE DAILY AT BEDTIME       Multiple Vitamins-Minerals (PRESERVISION AREDS) TABS        omeprazole (PRILOSEC) 20 MG DR capsule TAKE 1 CAPSULE BY MOUTH EVERY DAY FOR HEARTBURN       SENOKOT S 8.6-50 MG tablet TAKE 1 TAB BY MOUTH TWICE DAILY AS NEEDED FOR CONSTIPATION       simvastatin (ZOCOR) 40 MG tablet TAKE 1 TABLET BY MOUTH ONCE A DAY (AT BEDTIME) FOR CHOLESTEROL FOR 90 DAYS       SM IBUPROFEN 200 MG tablet TAKE 1-2 TABLETS (200-400MG) BY MOUTH EVERY 4-6 HOURS AS NEEDED DO NOT EXCEED 1200MG/DAY                   Deana Lane MD, MD, PhD  Neurology   Office tel: [phone]

## 2020-10-12 ENCOUNTER — RECORDS - HEALTHEAST (OUTPATIENT)
Dept: LAB | Facility: CLINIC | Age: 85
End: 2020-10-12

## 2020-10-13 LAB
ALBUMIN SERPL-MCNC: 3.2 G/DL (ref 3.5–5)
ALP SERPL-CCNC: 47 U/L (ref 45–120)
ALT SERPL W P-5'-P-CCNC: <9 U/L (ref 0–45)
ANION GAP SERPL CALCULATED.3IONS-SCNC: 7 MMOL/L (ref 5–18)
AST SERPL W P-5'-P-CCNC: 10 U/L (ref 0–40)
BILIRUB SERPL-MCNC: 0.9 MG/DL (ref 0–1)
BUN SERPL-MCNC: 11 MG/DL (ref 8–28)
CALCIUM SERPL-MCNC: 8.4 MG/DL (ref 8.5–10.5)
CHLORIDE BLD-SCNC: 108 MMOL/L (ref 98–107)
CO2 SERPL-SCNC: 26 MMOL/L (ref 22–31)
CREAT SERPL-MCNC: 0.67 MG/DL (ref 0.6–1.1)
ERYTHROCYTE [DISTWIDTH] IN BLOOD BY AUTOMATED COUNT: 12.5 % (ref 11–14.5)
GFR SERPL CREATININE-BSD FRML MDRD: >60 ML/MIN/1.73M2
GLUCOSE BLD-MCNC: 128 MG/DL (ref 70–125)
HCT VFR BLD AUTO: 34.1 % (ref 35–47)
HGB BLD-MCNC: 11.1 G/DL (ref 12–16)
MCH RBC QN AUTO: 31.8 PG (ref 27–34)
MCHC RBC AUTO-ENTMCNC: 32.6 G/DL (ref 32–36)
MCV RBC AUTO: 98 FL (ref 80–100)
PLATELET # BLD AUTO: 198 THOU/UL (ref 140–440)
PMV BLD AUTO: 11.7 FL (ref 8.5–12.5)
POTASSIUM BLD-SCNC: 3.1 MMOL/L (ref 3.5–5)
PROT SERPL-MCNC: 6 G/DL (ref 6–8)
RBC # BLD AUTO: 3.49 MILL/UL (ref 3.8–5.4)
SODIUM SERPL-SCNC: 141 MMOL/L (ref 136–145)
TSH SERPL DL<=0.005 MIU/L-ACNC: 1.8 UIU/ML (ref 0.3–5)
URATE SERPL-MCNC: 3.4 MG/DL (ref 2–7.5)
WBC: 6.1 THOU/UL (ref 4–11)

## 2020-10-14 LAB — 25(OH)D3 SERPL-MCNC: 31.5 NG/ML (ref 30–80)

## 2020-10-28 ENCOUNTER — RECORDS - HEALTHEAST (OUTPATIENT)
Dept: LAB | Facility: CLINIC | Age: 85
End: 2020-10-28

## 2020-10-29 LAB — POTASSIUM BLD-SCNC: 3.9 MMOL/L (ref 3.5–5)

## 2020-11-12 ENCOUNTER — RECORDS - HEALTHEAST (OUTPATIENT)
Dept: LAB | Facility: CLINIC | Age: 85
End: 2020-11-12

## 2020-11-13 LAB — POTASSIUM BLD-SCNC: 4.1 MMOL/L (ref 3.5–5)

## 2020-12-21 ENCOUNTER — RECORDS - HEALTHEAST (OUTPATIENT)
Dept: LAB | Facility: CLINIC | Age: 85
End: 2020-12-21

## 2020-12-22 LAB — POTASSIUM BLD-SCNC: 3.5 MMOL/L (ref 3.5–5)

## 2021-02-17 ENCOUNTER — RECORDS - HEALTHEAST (OUTPATIENT)
Dept: LAB | Facility: CLINIC | Age: 86
End: 2021-02-17

## 2021-02-18 LAB
ERYTHROCYTE [DISTWIDTH] IN BLOOD BY AUTOMATED COUNT: 13.5 % (ref 11–14.5)
HCT VFR BLD AUTO: 35.7 % (ref 35–47)
HGB BLD-MCNC: 11.7 G/DL (ref 12–16)
MCH RBC QN AUTO: 31.8 PG (ref 27–34)
MCHC RBC AUTO-ENTMCNC: 32.8 G/DL (ref 32–36)
MCV RBC AUTO: 97 FL (ref 80–100)
PLATELET # BLD AUTO: 222 THOU/UL (ref 140–440)
PMV BLD AUTO: 11.3 FL (ref 8.5–12.5)
POTASSIUM BLD-SCNC: 4.3 MMOL/L (ref 3.5–5)
RBC # BLD AUTO: 3.68 MILL/UL (ref 3.8–5.4)
WBC: 7.1 THOU/UL (ref 4–11)

## 2021-04-01 ENCOUNTER — RECORDS - HEALTHEAST (OUTPATIENT)
Dept: LAB | Facility: CLINIC | Age: 86
End: 2021-04-01

## 2021-04-01 LAB
SARS-COV-2 PCR COMMENT: NORMAL
SARS-COV-2 RNA SPEC QL NAA+PROBE: NEGATIVE
SARS-COV-2 VIRUS SPECIMEN SOURCE: NORMAL

## 2021-04-07 ENCOUNTER — RECORDS - HEALTHEAST (OUTPATIENT)
Dept: LAB | Facility: CLINIC | Age: 86
End: 2021-04-07

## 2021-04-15 ENCOUNTER — RECORDS - HEALTHEAST (OUTPATIENT)
Dept: LAB | Facility: CLINIC | Age: 86
End: 2021-04-15

## 2021-04-22 ENCOUNTER — RECORDS - HEALTHEAST (OUTPATIENT)
Dept: LAB | Facility: CLINIC | Age: 86
End: 2021-04-22

## 2021-04-30 ENCOUNTER — RECORDS - HEALTHEAST (OUTPATIENT)
Dept: LAB | Facility: CLINIC | Age: 86
End: 2021-04-30

## 2021-05-20 ENCOUNTER — RECORDS - HEALTHEAST (OUTPATIENT)
Dept: LAB | Facility: CLINIC | Age: 86
End: 2021-05-20

## 2021-05-21 LAB
ALBUMIN SERPL-MCNC: 3.6 G/DL (ref 3.5–5)
ANION GAP SERPL CALCULATED.3IONS-SCNC: 8 MMOL/L (ref 5–18)
BUN SERPL-MCNC: 19 MG/DL (ref 8–28)
CALCIUM SERPL-MCNC: 8.6 MG/DL (ref 8.5–10.5)
CHLORIDE BLD-SCNC: 103 MMOL/L (ref 98–107)
CO2 SERPL-SCNC: 27 MMOL/L (ref 22–31)
CREAT SERPL-MCNC: 0.82 MG/DL (ref 0.6–1.1)
ERYTHROCYTE [DISTWIDTH] IN BLOOD BY AUTOMATED COUNT: 12.4 % (ref 11–14.5)
GFR SERPL CREATININE-BSD FRML MDRD: >60 ML/MIN/1.73M2
GLUCOSE BLD-MCNC: 80 MG/DL (ref 70–125)
HCT VFR BLD AUTO: 34.5 % (ref 35–47)
HGB BLD-MCNC: 11 G/DL (ref 12–16)
MCH RBC QN AUTO: 31.7 PG (ref 27–34)
MCHC RBC AUTO-ENTMCNC: 31.9 G/DL (ref 32–36)
MCV RBC AUTO: 99 FL (ref 80–100)
PHOSPHATE SERPL-MCNC: 3.8 MG/DL (ref 2.5–4.5)
PLATELET # BLD AUTO: 192 THOU/UL (ref 140–440)
PMV BLD AUTO: 11.8 FL (ref 8.5–12.5)
POTASSIUM BLD-SCNC: 4.3 MMOL/L (ref 3.5–5)
RBC # BLD AUTO: 3.47 MILL/UL (ref 3.8–5.4)
SODIUM SERPL-SCNC: 138 MMOL/L (ref 136–145)
WBC: 7.1 THOU/UL (ref 4–11)

## 2021-05-25 ENCOUNTER — RECORDS - HEALTHEAST (OUTPATIENT)
Dept: ADMINISTRATIVE | Facility: CLINIC | Age: 86
End: 2021-05-25

## 2021-05-26 ENCOUNTER — RECORDS - HEALTHEAST (OUTPATIENT)
Dept: ADMINISTRATIVE | Facility: CLINIC | Age: 86
End: 2021-05-26

## 2021-05-29 NOTE — PROGRESS NOTES
"Physical Therapy  Physical Therapy  Movement Disorders  Medicare Certification    Medical Diagnosis: Parkinson's Disease    Treatment Diagnosis: bradykinesia, unstable balance, gait instability/abnormality, LE weakness and decreased functional mobility    Referring MD: Dr. Deana Lane  Onset Date: 4/5/2019  Start of Care: 5/23/2019     Assessment: Patient is a 87 yo female with Parkinson's Disease x 2 years who presents with complaints of difficulty with transfers, walking in apartment and getting to the bathroom and balance instability as well as B knee pain.  Patient has a significant fall history with the most recent fall 2 days prior to evaluation.  Physical therapy evaluation revealed high fall risk on the He Balance Scale (16/56), Timed up and go (34 seconds) and gait speed (0.31 m/s).  She has significant difficultly standing from chair that do not have armrests leading to moderate assistance from therapist and heavy use of UE support from armed chairs.  Patient demonstrated scores significantly below age/gender norms on the 30\" Chair Stand, gait speed and 6 Minute Walk test indicated lower extremity weakness, gait inefficiencies and decreased activity tolerance.  Patient required rest breaks and slowed walking with distances of 100 feet despite using 4WW.  Overall she demonstrates global weakness and bradykinetic movements that greatly affect her functional mobility and independence at home and in the community.  Patient would benefit from skilled 1:1 PT in order to address deficits and optimize function.     Prognostic Indicators: Rehabilitation Potential Fair and based on age, PMH and family support  Impairment: Acitivity Tolerance, Balance, Bradykinesia/Hypokinesia, Motor Function, Motor Planning/Coordination, Pain, Postural/Gait Instability, Sensory Function and Gait    Functional Goals to be met by 10 visits  Patient will show improved efficiency and quality of movement, improved gait and postural " stability for increased safety, decreased fall risk when performing ADL's, IADL's, household &/or community ambulation as measured by:      360 degree turn < /= 13 steps,    TUG < /= 26 seconds,    30 Second Chair Stand > /=8 stands with UE support,    Transitional Mobility:     bed mobility with SBA,      Transfers from chair without arm rest with [place],    Gait speed > 0.48m/second, # steps in 6 meters < /=19 steps    report less than 4 falls per month,   Patient will ambulate > 200 feet in 6 minute with RPD< 5/10 to indicate improved functional activity tolerance for participating in social events &/or household/community ambulation.    Patient will be mod I/SBA with HEP  Patient Functional Goal: improved turns and walking in order to reach bathroom easier  Goals were established with the patient: yes    Plan of Care  Communication with: referral Source, patient, caregiver and treatment Team, Patient / Family / Instruction: Etiology of diagnosis or presented symtoms, Treatment plan/rationale, Home Exercise Program and Expected Functional Outcomes, Big/PWR Techniques, Therapeutic Exercise, Mobility / Transfer Training, Gait Training, Neuro Re-ed / Balance, Compensatory Strategies and Equipment Needs    Frequency/Duration 2x/week for up to 10 visits    MEDICARE PATIENTS:  HICN # [id number]  Provider #   Certification Dates: from 5/23/2019  to 8/18/2019        Physician Recommendation:  1. I certify the need for these services furnished within this plan and while under my care. I agree with the therapist's recommendation for plan of care.    2. If there is any recommendation for modification of therapy plan, please indicate below.      Physician's Signature (Printed):  _____________________________

## 2021-05-29 NOTE — PROGRESS NOTES
Speech Language/Pathology  Outpatient Speech Therapy   Evaluation and Initial Plan of Care    Diane Urbina  YOB: 1932      [id number]   Referring Provider: Deana Lane MD    Date of Onset: Order in Epic in April 2019  Start of Care: 5/23/19  Medical Diagnosis: Parkinson's Disease  Treatment Diagnosis: dysarthria and voice   Session 1 of 9    Medicare Patient   Provider #:   HICN #: [id number]  Certification Dates: 5/23/19 to 8/23/19      Assessment  Patient presents with motor speech deficits. Speech and voice deficits are characterized by reduced vocal projection and reduced breath support which impacts vocal quality and vocal loudness and influencing how well patient is heard/understood. 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 Patient demonstrates decreased sensory awareness of how much effort they feel they are using to speak and volume of voice they produce vs. actual voice volume and projection of speech. Patient is appropriate for skilled 1:1 ST to address speech/voice and communicative impairments related to Parkinson's in order to optimize function due to progressive nature of disease. Rehab potential is good based on time post onset. Patient is a good candidate for speech therapy and rehab potential is judged as good due to stimulability for improved communication during evaluation with strategies to increase amplitude. Patient/family was informed of the results and was agreement with the recommendations.     Plan  Speech therapy 2 times per week for 4 weeks.  Ongoing patient/family instruction regarding treatment plan/rationale, home program, expected functional outcome .      LONG TERM GOALS:  1. Patient will modify voice to improve vocal loudness and communication to meet home demands to maintain level of independence    SHORT TERM GOALS:  1. Perform x1 vocal warm-ups while maintaining a minimum vocal intensity of 70 dB with  mod cues.  2. Sustain phonation of /a/ for 5 seconds while maintaining a minimum of  71 dB with max cues.  3. Recite numeric sequences while maintaining a minimum of 72 dB with mod cues.  4. Read functional phrases and sentences while maintaining a minimum of 70 dB on at least 80% of time with mod cues.  5. Produce functional phrase while maintaining a minimum of 70 dB on at least 80% of time YANI.    SUBJECTIVE  Patient presents as alert and cooperative during the session.  An  was not applicable.  Patient reports no pain    Subjective:    Patient is a 86 year old female who presents for a speech, voice, and communication evaluation secondary to a diagnosis of Parkinson's Disease. Patient was diagnosed in 2016. Patient lives with  in [facility] living in [place], [place]. Upon inquiry, patient reports having to repeat herself at time. She reports no history of completing speech therapy in the past.     Objective:   SWALLOWING:  Does patient/family report difficulty with swallowing?   No   ( x ) Regular diet     ORAL PERIPHERAL SCREENING:  Labial function:    Reduced R ROM  Lingual function:  Mild R deviation, mild weakness, WNL ROM and speed  Velar function:     WFL    Comments:    Facial expression: Masking with mild R facial droop   Diadochokinetic rates:  variable rate       imprecise articulation    EVALUATION OF SPEECH & VOICE:    Vocal Intensity:   Conversation: Avg. vocal intensity 66 dB  Paragraph reading: Avg. vocal intensity: 67 dB  Avg. intensity of sustained phonation on /a/: 70 dB for 4 seconds.       Appears WFL Mild Impairment Moderate Impairment Severe Impairment Profound Impairment   Vocal Intensity  x      Breath Support   x     Intonation of Speech  x      Articulation x       Intelligibility x       Fluency x       Rate of Speech x       Vocal Quality   x       Vocal Quality Description:   (  ) Wet vocal quality   (  x) Glottal bill   (  ) Pitch Breaks       (  ) Phonation Breaks   (  )  Vocal Tremors   (  ) Breathy   (  ) Aphonia    ( x ) Harsh/Hoarse  Additional Information:        STIMULABILITY TESTING:  Using techniques patient performed the following:  Sustained Phonation (average): 3 seconds at an average vocal intensity of 68 dB  Patient read phrases/sentences at a vocal intensity average of 73 dB    The following improvements were noted:   (  ) No Improvement   (  ) Articulation  (  ) Vocal Quality   (  ) Facial Expression    (  ) Fluency    (  ) Intonation of Speech  (  ) Rate of Speech   (  ) Intelligibility  ( x ) Vocal Intensity   ( x ) Breath Support          Time: 43 speech/language minutes    Diya Feldman MS, CCC-SLP    Physician Recommendation:  1. I certify the need for these services furnished within this jplan and while under my care.  I agree with the therapist's recommendtion for plan of care.  2. If there is any recommendation for modification of therapy plan, please indicate below.    Physician Signature: ____________________________________________

## 2021-05-29 NOTE — PROGRESS NOTES
"Physical Therapy  Physical Therapy  Movement Disorder Treatment Note    Date: 6/4/2019   Visit #: 2/10  Rx Units: TE 4  Total OP Minutes: 60  MEDICARE  Certification Dates: from 5/23/2019  to 8/18/2019    Pain:  Yes   Location:  R LE (Laterally from ankle up to hip) \"Constant\" 8/10  Intervention:  No modifications needed this date as pain was present but not limiting with exception of walking      Subjective:  Pt reports her biggest complaint is the pain in her R leg that limits her from moving  , Eleazar present for treatment    Objective:  Therapeutic Exercise:  Total Minutes: 60  Sustained Movements (sitting)  4reps  x 1-2 sets      Exercise   Reps   Sets   Effort     1A Floor to Ceiling Without Flicking   6 1 NR     1B Side to Side Without Flicking 3 1 3 reps each side      Comments:   1A:  VCs for initial stance of wide TIFFANIE and upright posture. With VC pt was able to immediately improve posture however pt continues with forward head.  Pt slow to move to down BIG and does not reach floor. Pt is able to extend arms towards ceiling independently extending her trunk. 50% shaping required initially and total modeling for improved shoulder retraction and cervical extension. VCs to voice louder. Good carry through with cuing but would quickly   1B: Poor lower trunk rotation and little to no pivot occurring on LEs.  R LE stays IR'd.  Shaping for improved shoulder flexion and supination of hand. VCs for \"head up\" Provided visual cuing of \"holding a platter\" to assist with maintenance of arm supination throughout hold    Other:   NuStep:  For aerobic and muscular conditioning:  Assist pt with set up:  Seat # 8  Arms# 8  Introduced pt to machine and educated pt on benefits  VCs to attempt to achieve SPM at </= 60 SPM  Workload 2   Total Minutes:  10 minutes  PRE: 7/10      -Educated pt on PRE scale and PWR, amplitude and its relation with Parkinson's Pt and  receptive to education and verbalized understanding. " "Instructed pt that  should encourage pt to talk and move \"BIG!\"    -Educated pt about getting a w/c for home and community use Pt cannot move fast enough to make it to the bathroom and has a hard time standing due R LE pain therefore limiting her ability with meal prep and ADLs.  Will plan for ATP to come out during a visit for w/c assessment. Pt would benefit from a lightweight manual w/c.    LE hip strengthening for improved stability with all weight bearing exercise:  Sidelying:  Clam Exercises 15 reps x 2 sets  Min A to maintain hips at neutral alignment as pt tend to roll posterior slightly Observed minimal ROM achieved due to significant hip weakness.    Neuro-Reeducation/Balance:  Total Minutes:   Multidirectional Repetitive Movements.  8-16reps  x 1-2 sets    Step and Reach:   UE support:   Chairs Nearby:       Exercise   Reps   Sets   Effort     2A Forward Step and Reach          2B Sideward Step and Reach        2C Backward Step and Reach        Comments:     Rock and Reach:  UE support:   Chairs Nearby:       Exercise   Reps   Sets   Effort     2D Left leg Forward/Backward          2D Right leg Forward/Backward        2E Side to Side        Comments:   Other:     Gait Training:   Total Minutes:     Time Speed (MPH) UE support Direction Incline Comments                                      Other:     Therapeutic Activity  Total Minutes:      Functional Movement  Rep  Sets  Effort    Rolling        Supine to Sit       Sit to Stand from standard chair       Sit to Stand from low, soft chair       Sit and Reach       Walk and Turn       PWR Moves:       PWR Moves:        Comments:     Big Walking:     Other:     Carryover Assignment: Clam Exercises (handout provided), 1A and 1B of BIG exercises (handouts provided)    Assessment/Plan for week:  6/3 -6/7/2019:  Pt presents to PT for first subsequent visit following initial evaluation. Pt complains of R LE with all wt bearing activities. Pt demonstrates " significant core, hip and glute weakness as pt presents with R knee genu valgum.  This weakness most likely contributes to R LE pain. Pt has low amplitude of all movements. Her transitional movements are slow and demonstrates mild freezing with initiation of transfers.  Strengthening and education were a primary focus of therapy today to assist with eventual ease/efficiency of mobility. Pt remains an excellent candidate for therapy however progress may be slow due to R LE pain.    Goals Established at [facility] on: 5/23/2019   360 degree turn < /= 13 steps,               TUG < /= 26 seconds,               30 Second Chair Stand > /=8 stands with UE support,               Transitional Mobility:                           bed mobility with SBA,                            Transfers from chair without arm rest with Hayder,               Gait speed > 0.48m/second, # steps in 6 meters < /=19 steps                report less than 4 falls per month,    Patient will ambulate > 200 feet in 6 minute with RPD< 5/10 to indicate improved functional activity tolerance for participating in social events &/or   household/community ambulation.     Patient will be mod I/SBA with HEP

## 2021-05-29 NOTE — PROGRESS NOTES
Physical Therapy  PHYSICAL THERAPY  MOVEMENT DISORDER:  INITIAL EVALUATION    SUBJECTIVE INFORMATION    Diagnosis: Parkinson's Disease  Date of diagnosis:     Date of last Neurologist visit: 2019  Treatment Diagnosis: bradykinesia, unstable balance, gait instability/abnormality, LE weakness and decreased functional mobility  Precautions/pmh: R TRINY , L hip fracture 2016, R peroneal neuropathy, macular degeneration, anxiety  First movement disorder symptom presentation: tremors  Date: a few years before diagnosis, unable to determine date  Non-motor symptoms: Visual impairments, Dementia, Anxiety, Orthostatic hypotension, Urinary symptoms, Constipation, Sleep disturbances and Fatigue    Time of Evaluation: 0800       Time of last PD med: 0600       Time of next PD med: 1100  On/Off presentation/symptoms: increased tremors, anxiety, and light headedness  History of PD specific PT? No    Pain: Yes  Location: B knees, Ratin/10, Intervention: rest prn, tylenol    Living Situation:Senior apartment. - no services at this time  Caregiver Support: lives with  - barb who assists with ADLs and IADLs, pt's daughter lives about 15 minutes away, who were present during evaluation  Equipment: 4WW - all the time. Needs to use the seated when fatigue or difficulty moving -  will push her on the chair.   Current Activity Level: performs a few arm exercises 1x/wk at most.  Mostly sedentary lifestyle.   Chief Mobility Complaint: Increased falls over the past year - unable to get balance with initial standing balance and falls back into chair, difficulty with turns and instability.  Uses 4WW d/t weakness, unsteady, and B knee pain.  Unable to participate in community due to fatigue, right leg pain and unable to reach to upper shelves.  Difficulty with turns and pivoting due to R leg pain. Patient reports that her  needs to assist her in the bathroom due to balance issues when pulling up/down pants.   B  knee arthritis leads to pain with transfers and ambulation.   Pt requires UE support and armrests in order to stand from seated position.  Difficulty getting into bed with legs and needs her  to lift her legs into bed.  Patient and family report that her movements worsen as the day progresses and it is difficulty for her to move around her apartment and get to the bathroom in the afternoons and evenings.    Patient's Functional Goal: walk better and turn without significant knee pain to ease use of and getting to bathroom    Fall history:frequent with most recent two days ago and 2 police had to help her up from the floor    Freezing: Often - about once a day at least      OBJECTIVE INFORMATION    Cognition: see OT evaluation MOCA:     Bradykinesia and Hypokinesia (Combining slowness, hesitency, decreased arm swing, small amplitude and poverty of movement in general):  Marked    Dyskinesia:No    Posture: rounded shoulder and forward head posture     Postural Stability: Posterior tug test (Response to sudden posterior displacement produced by pull on shoulders while patient is erect, with eyes open and feet slightly apart.  Patient is prepared.)  Absence of postural reponse: would fall if not caught by examiner    Transitional Movements  Sit?Stand:   SBA and heavy UE support on armrests and slow movement and braces knees together for support. ModA from armless chair       Sit? Supine:   NT, however pt reports needing assistance for LEs to get into bed  360 degree turn:  16 steps with 4WW    TUG(Timed Up and Go): 34.11 seconds with 4WW    (>13 sec:  Falls Risk)      Divided Attention   Cognitive TU.12 seconds (Task):  Counting backwards by 1 from 100 (100-89 correctly). Therapist prompted pt to count while she was walking      Strength   30 Second Chair Stand:  # 6  With UE A from armed chair, pt reports R ankle pain /10 post  Age/Gender Norm:10       Balance      Eyes open Eyes closed      Romberg (sec) 30 30     Semi-Romberg (sec) 2  NT     Sharpened Romberg (sec) unable unable    Left LE Right LE     Single Leg Stance (sec) unable unable      Balance Comments: Pt relies heavily on UEs to assist with balance and transfers. She demonstrates instability during NBOS.      DOTSON Balance Scale:     0/4  Sit to stand   3/4  Standing unsupported   4/4 Sitting unsupported   2/4  Stand to sit   1/4 Pivot transfer  - pt tends to pivot as she is sitting down to chair and barely sits on the seat if armrests are not present  3/4 Standing with eyes closed   3/4 Standing with feet together   0/4  Reaching forward with outstretched arm   0/4 Retrieving object from floor   0/4  Turning to look behind   0/4 Turning 360 degrees   0/4 Alternate stepping   0/4 Standing with one foot in front   0/4 Single leg stance     16/56 = TOTAL   <46/56 indicates high falls risk       Motor Planning / Coordination   Four Square Step Test NT    Gait   Preferred Gait Speed  6 meters in 19.18 seconds Meters/second: 0.31 Age/Gender Norm:   1.15+/-0.21 meters/second  # steps in 6 meters: 20  Assistive Device: 4WW  Gait Speed Fall risk:  High Falls Risk (<0.6 m/s)  Gait Analysis: decreased step length B, minimal foot clearance, no heel strike B, downward gaze and flexed posture    Fast Gait Speed  6 meters in 16.94 seconds Meters/second: 0/35 Age/Gender Norm:   1.59+/-0.28 meters/second  # steps in 6 meters: 20  Assistive Device: 4WW  Gait Analysis: continues with decreased step length, minimal foot clearance and no heel strike B    Activity Tolerance   6 minute walk test:  100 feet with 4WW    RPD:  NR    Age/Gender Norm: 1278'        Total OP Minutes: 55      Rx Units: Evaluation

## 2021-05-29 NOTE — PROGRESS NOTES
Speech Language/Pathology  Speech Therapy Outpatient Daily Visit Note    Diane Urbina  YOB: 1932     [id number]    Session 2 of 9    Medicare Patient   Provider #:   HICN #: [id number]  Certification Dates: 5/23/19 to 8/23/19    Subjective  Patient presents as alert and cooperative during this session.  An  was not applicable for this session.  Patient reports pain at 10 out of 10 and pain located in R leg   Patient is noted to have increased fatigue by end of session. Patient had physical therapy prior to speech therapy.     Objective  Perform x1 vocal warm-ups while maintaining a minimum vocal intensity of 70 dB with mod cues.  -Patient achieves desired loudness 100% of time with max cues to improve vocal projection. Vocal quality is strained and hoarse.     Sustain phonation of /a/ for 5 seconds while maintaining a minimum of  71 dB with max cues.  -max cues and models to improve vocal projection and quality. Reduced sustaining ah that fades in intensity and projection. Vocal quality is hoarse with pitch breaks. Averages 3 seconds at 74 fading to 67 dB.     Recite numeric sequences while maintaining a minimum of 72 dB with mod cues.  -Patient achieves desired loudness 80% of time with max cues to improve vocal projection increasing to 100% with mod cues with repetition of task. Vocal quality fluctuates from clear to strained..     Read functional phrases and sentences while maintaining a minimum of 70 dB on at least 80% of time with mod cues.  -Patient achieves desired loudness 70% of time with max cues to improve vocal projection. Vocal quality fluctuates from clear to strained at either beginning of phrase or end secondary to breath support. Patient demonstrates signs of actively improving voice to make voice stronger.      Produce functional phrase while maintaining a minimum of 70 dB on at least 80% of time YANI.  -Educated and reviewed purpose of goal to obtain functional  phrase for tasks at home to improve vocal function so patient can meet demands.        Assessment  Patient participates well in therapy and responds well to verbal cues and models to improve vocal function and projection. Vocal loudness improves with strategy of applied exertion and breath support during vocal exercises and reading task. Patient demonstrates fluctuating levels of breath support which impacts loudness and quality. Vocal quality is characterized by hoarse, strained, and vocal bill at either beginning or end of phrases. Patient continues to be appropriate for voice therapy secondary to parkinsons disease to manage vocal function and establishment of home program to manage PD symptoms.     Plan   Current goals remain appropriate    Time: 45 speech/language minutes    Diya Feldman MS, CCC-SLP

## 2021-05-29 NOTE — PROGRESS NOTES
Speech Language/Pathology  Speech Therapy Outpatient Daily Visit Note    Dinae Urbina  YOB: 1932     [id number]    Session 4 of 9    Medicare Patient   Provider #:   HICN #: [id number]  Certification Dates: 5/23/19 to 8/23/19      Subjective  Patient presents as alert and cooperative during this session.  An  was not applicable for this session.  Patient reports pain at 8 out of 10 and pain located in knees and legs    Objective  Perform x1 vocal warm-ups while maintaining a minimum vocal intensity of 70 dB with mod cues.  -Patient achieves desired loudness 100% of time with mod cues to improve vocal projection. Vocal quality is strained and hoarse.      Sustain phonation of /a/ for 5 seconds while maintaining a minimum of  71 dB with max cues.  -4 seconds at 78 dB with max cues and models. Strained vocal quality with pitch breaks.   -4 seconds at 79 dB fading to 74 dB with max cues and models. Strained vocal quality with pitch breaks.   -6 seconds at 77 db with mod cues and models. Strained vocal quality.      Recite numeric sequences while maintaining a minimum of 72 dB with mod cues.  -Patient achieves desired loudness 100% of time with max cues to improve vocal projection increasing to 100% with mod cues with and maintaining at 100% with no cues. Vocal quality fluctuates from clear to strained..      Read functional phrases and sentences while maintaining a minimum of 70 dB on at least 80% of time with mod cues.  -not addressed this session secondary to patient reporting blurry vision.      Produce functional phrase while maintaining a minimum of 70 dB on at least 80% of time YANI.  -Patient achieves 100% after models and with min cues for breath support     Phrases:   -Eleazar, I need help   -Bring me a cup of coffee please   -Don't change the channel  -Who left the door open?  -Did you lock the door?   -I need to get home to watch  Su      Assessment  Patient continues to  require varying levels of verbal cues and models to improve breath support across all tasks. Reduced breath support impacts vocal quality and results in harsh quality and reduced projection. Patient demonstrates ability to adapt vocal projection with cues however minimal signs of monitoring projection at this time. Continue to progress towards this skills in functional contexts.     Plan   Current goals remain appropriate    Time: 46 speech/language minutes    Diya Feldman MS, CCC-SLP

## 2021-05-29 NOTE — PROGRESS NOTES
Speech Language/Pathology  Speech Therapy Outpatient Daily Visit Note    Diane Urbina  YOB: 1932     [id number]    Session 5 of 9    Medicare Patient   Provider #:   HICN #: [id number]  Certification Dates: 5/23/19 to 8/23/19    Subjective  Patient presents as alert and cooperative during this session.  An  was not applicable for this session.  Patient reports pain at 7 out of 10 and pain located in R leg     Objective  Perform x1 vocal warm-ups while maintaining a minimum vocal intensity of 70 dB with mod cues.  -Patient achieves desired loudness 100% of time with min cues to improve vocal projection. Patient is able to identify moments of decreased vocal projection.      Sustain phonation of /a/ for 5 seconds while maintaining a minimum of  71 dB with max cues.  -4 seconds at 75 dB with max cues and models. Strained vocal quality with pitch breaks.   -5 seconds at 78 dB fading to 75 with max cues and models. Strained vocal quality with pitch breaks.   -4 seconds at 74 db with max cues and models. Strained vocal quality.   -8 seconds at 78 dB fading to 74 with max cues. Strained vocal quality.      Recite numeric sequences while maintaining a minimum of 72 dB with mod cues.  -Patient achieves desired loudness 100% of time with mod cues to improve vocal projection and maintaining at 100% with fading of cues to none. Vocal quality fluctuates from clear to strained..      Read functional phrases and sentences while maintaining a minimum of 70 dB on at least 80% of time with mod cues.  -Patient achieves 100% with initial min cues fading to none.     Produce functional phrase while maintaining a minimum of 70 dB on at least 80% of time YANI.  -Patient achieves 100% with initial min cues for breath support     Phrases:   -Eleazar, I need help   -Bring me a cup of coffee please   -Don't change the channel  -Who left the door open?  -Did you lock the door?   -I need to get home to watch   Su    Assessment  Patient demonstrates ability to recall strategies to adapt voice and demonstrates ability to adapt voice during structured tasks with fading of cues. Patient inconsistently actively monitoring breath support and requires verbal cues to take a breath to prevent onset of strained/vocal bill vocal quality.     Plan   Current goals remain appropriate    Time: 40 speech/language minutes    Diya Feldman MS, CCC-SLP

## 2021-05-29 NOTE — PROGRESS NOTES
"Physical Therapy  Physical Therapy  Movement Disorder Treatment Note    Date: 6/7/2019   Visit #: 3/10  Rx Units: TE 3; TA 1  Total OP Minutes: 60  MEDICARE  Certification Dates: from 5/23/2019  to 8/18/2019    Pain:  Yes   Location:  R LE (Laterally from ankle up to hip) \"Constant\" 8/10  Intervention:  No modifications needed this date as pain was present but not limiting with exception of walking      Subjective:  Pt reports she was sore following last treatment. She also reported she did her exercises at home \"I probably didn't do them as well as you had me doing them.\"  , Eleazar present for treatment    Objective:  Therapeutic Exercise:  Total Minutes: 45  Sustained Movements (sitting)  4reps  x 1-2 sets      Exercise   Reps   Sets   Effort     1A Floor to Ceiling Without Flicking   4 2 7/10     1B Side to Side Without Flicking 4 2 5/10      Comments:   1A:  VCs for initial stance of wide TIFFANIE and upright posture. With VC pt was able to immediately improve posture  independently extending her trunk. VCs throughout for BIG arms and fingers \"Get BIGGER than me!\"  VCs to increase amplitude of voice Good carry through with cuing   1B: Poor lower trunk rotation and little to no pivot occurring on LEs. Greater trunk rotation to L than R.  50% shaping for improved rotation and supination of hand. VCs for forward gaze and greater amplitude with voice.    Other:   NuStep:  For aerobic and muscular conditioning:  Assist pt with set up:  Seat # 9  Arms# 10     VCs to attempt to achieve SPM at </= 60 SPM  Workload 2 \"Oh I better get going I haven't hit that 60 ambrocio!\"  VCs for BIG \"motions\". Modeling provided to demonstrate  Pt reports some soreness in R ankle with NuStep  Total Minutes:  12 minutes  PRE: 5/10  AVG SPM:  57  METs: 1.4      -Reviewed PRE scale and how to use it Pt verbalized understanding    LE hip strengthening for improved stability with all weight bearing exercise:  Sidelying:  Clam Exercises R LE: 10 " "reps x 3 set  VCs for intial positioning  TCs at hip to prevent pelvis  from retracting Very minimal AROM achieved d/t weakness  -Bridges 10 reps x 3 sets  Pt initially c/o  \"chandler horses\" in thighs however with repositioning and small passive stretch cramping was relieved  Pt able to achieve fair hip clearance with each rep  -Hip Abd in hooklying against burgundy tband 10 reps x 2  TCs and VCs to maximize R hip Abd as pt only able to reach just past neutral    Neuro-Reeducation/Balance:  Total Minutes:   Multidirectional Repetitive Movements.  8-16reps  x 1-2 sets    Step and Reach:   UE support:   Chairs Nearby:       Exercise   Reps   Sets   Effort     2A Forward Step and Reach          2B Sideward Step and Reach        2C Backward Step and Reach        Comments:     Rock and Reach:  UE support:   Chairs Nearby:       Exercise   Reps   Sets   Effort     2D Left leg Forward/Backward          2D Right leg Forward/Backward        2E Side to Side        Comments:   Other:     Gait Training:   Total Minutes:     Time Speed (MPH) UE support Direction Incline Comments                                      Other:     Therapeutic Activity  Total Minutes: 15     Functional Movement  Rep  Sets  Effort    Rolling  1 1     Supine to Sit 1      Sit to Stand from standard chair 3 1     Sit to Stand from low, soft chair       Sit and Reach       Walk and Turn       PWR Moves:       PWR Moves:        Comments:   Rolling:  Min A VC to reach across and assume side lying position to prep self for more efficient supine to sit transfer  Supine to Sit - Min A to assist with moving trunk to midline due to weakness  Sit to Supine - VCs to move into side lying before rolling back. Min A to help elevate LEs onto bed Pt c/o dizziness with supine position but with rest this is relieved  Sit to stand from chair - modeling for feet placement and forward trunk. Educated pt on body mechanics of  order to prevent LOB posterior.  Min A " required for forward wt shift as pt relies heavily onto UEs and lifts self straight up vs forward and has difficulty wt shift forward.    Big Walking:     Other:     Carryover Assignment: Clam Exercises (handout provided), 1A and 1B of BIG exercises (handouts provided)    Assessment/Plan for week:  6/3 -6/7/2019:  Pt would highly benefit from continued strengthening to improve efficiency and independence with transfers and gait. See previous visit for this week's A and P.  Goals remain appropriate and pt remains motivated to continue with PT as primary goals is to improve ease of all functional mobility.    Goals Established at [facility] on: 5/23/2019   360 degree turn < /= 13 steps,               TUG < /= 26 seconds,               30 Second Chair Stand > /=8 stands with UE support,               Transitional Mobility:                           bed mobility with SBA,                            Transfers from chair without arm rest with Hayder,               Gait speed > 0.48m/second, # steps in 6 meters < /=19 steps                report less than 4 falls per month,    Patient will ambulate > 200 feet in 6 minute with RPD< 5/10 to indicate improved functional activity tolerance for participating in social events &/or   household/community ambulation.     Patient will be mod I/SBA with HEP

## 2021-05-29 NOTE — PROGRESS NOTES
"Physical Therapy  Physical Therapy  Movement Disorder Treatment Note    Date: 6/11/2019   Visit #: 4/10  Rx Units: TA 3; TE 1  Total OP Minutes: 55  MEDICARE  Certification Dates: from 5/23/2019  to 8/18/2019    Pain:  Yes   Location:  R LE (Laterally from ankle up to hip) \"Constant\" 8/10  Intervention:  Attempted to modify wt bearing activities to decrease pain      Subjective:  Pt reports she feels as if her knee is getting worse. Pt appears frustrated. With further questioning it appears the knee isn't worse, but just the same, constant pain of 8/10. Pt reports when she is at rest during sitting or supine the pain goes away however it increases with sit to stands and weight bearing.  Pt also complains throughout therapy of R ankle pain. Pt's R ankle is significantly swollen increasing the pain with excessive DF  , Eleazar present for treatment    Objective:  Therapeutic Exercise:  Total Minutes: 15  Sustained Movements (sitting)  4reps  x 1-2 sets      Exercise   Reps   Sets   Effort     1A Floor to Ceiling Without Flicking        1B Side to Side Without Flicking         Comments:   1A:     1B: e.    Other:   NuStep:  For aerobic and muscular conditioning:  Assist pt with set up:  Seat # 9  Arms# 10    VCs to attempt to achieve SPM at </= 60 SPM and pt maintain BIG movements    Workload 2   Pt reports  soreness in R ankle after approximately 8 minutes Pt has increased swelling in this area  Total Minutes:  15 minutes  PRE: 6/10  AVG SPM:  54  METs: 1.5        Neuro-Reeducation/Balance:  Total Minutes:   Multidirectional Repetitive Movements.  8-16reps  x 1-2 sets    Step and Reach:   UE support:   Chairs Nearby:       Exercise   Reps   Sets   Effort     2A Forward Step and Reach          2B Sideward Step and Reach        2C Backward Step and Reach        Comments:     Rock and Reach:  UE support:   Chairs Nearby:       Exercise   Reps   Sets   Effort     2D Left leg Forward/Backward          2D Right leg " "Forward/Backward        2E Side to Side        Comments:   Other:     Gait Training:   Total Minutes:     Time Speed (MPH) UE support Direction Incline Comments                                      Other:     Therapeutic Activity  Total Minutes: 40     Functional Movement  Rep  Sets  Effort    Rolling       Supine to Sit       Sit to Stand from standard chair See Below      Sit to Stand from low, soft chair       Sit and Reach       Walk and Turn       PWR Moves:       PWR Moves:        Comments:     Sit <> Stands to FWW from elevated mat:, VCs to move one hand to walker when standing and one hand to mat when transitioning to sit.  Educated pt on benefits of wt shifting forward and \"pushing\" herself up vs pulling with both hands on walker.  VCs to look up when in standing, engage glutes and to think BIG to get up as pt maintains downward gaze with flexed posture. B knees positioned in valgus position.   25\" Mat:  5 reps   24\" 5 reps Min A to weight shift forward with c/o increased pain in R ankle.    24\" 3 reps in staggered stance with R leg forward in attempts to decrease R LE pain however them pt c/o increased pain in L knee.   24\" 1 rep Min A to knee to prevent valgus however pt c/o pain in ankle again with wt shifting forward to stand d/t excessive R DF    Big Walking: With 4WW x 85 feet x 2  SBA VCs for BIG steps to achieve step through gait  Pt walks with flexed posture, downward gaze. With cuing pt able to achieve step through gait. Distance limited by pain    Seated hip abduction with Burgundy T-band 10 reps  Pt achieves less R hip abduction than L.  Provided pt with burgundy tband to perform exercise at home Instructed pt to attempt to achieve equal AROM.  Other:     Carryover Assignment: Clam Exercises (handout provided), 1A and 1B of BIG exercises (handouts provided);  B Hip Abduction with burgundy tband    Assessment/Plan for week:  6/10 - 6/15/2019:  Pt appeared frustrated with her pain this date.  " Attempted to modify transfers to decrease pain however pain would transition elsewhere. Pt has significant muscular weakness contributing to arthritic pain. Pt also has swelling in R ankle but when asked pt reports she is not really doing anything for the swelling and doesn't know why she has it. Plan to continue to focus on LE strengthening in order to improve muscular strength which then will decrease joint discomfort and increase mobility.  Goals remain appropriate however progress will be slow d/t pt's age and lack of strength and increased pain.    Goals Established at [facility] on: 5/23/2019   360 degree turn < /= 13 steps,               TUG < /= 26 seconds,               30 Second Chair Stand > /=8 stands with UE support,               Transitional Mobility:                           bed mobility with SBA,                            Transfers from chair without arm rest with Hayder,               Gait speed > 0.48m/second, # steps in 6 meters < /=19 steps                report less than 4 falls per month,    Patient will ambulate > 200 feet in 6 minute with RPD< 5/10 to indicate improved functional activity tolerance for participating in social events &/or   household/community ambulation.     Patient will be mod I/SBA with HEP

## 2021-05-29 NOTE — PROGRESS NOTES
Occupational Therapy  Movement Disorders/Parkinson's Occupational Therapy Initial Evaluation    Medicare Insurance           Units: 1 OT evaluation  Total Minutes: 56    Medical Diagnosis: Parkinson's Disease   Treatment Diagnosis:Lack of Coordination 781.3, Muscle weakness (general) 729.87 and Cognitive Impairment 294.9, impaired mobility & ADLs, tremor, decreased activity tolerance    Referring Practitioner: Dr. Lane  Date of Onset: 2016  Start of Care: 5/23/2019    ASSESSMENT  Patient is an 86 year old female who received diagnosis of Parkinson's disease in 2016. Patient arrived immediately following evaluation from speech therapy and physical therapy.  Pt was accompanied by her daughter and .  Pt reported feeling very fatigued following previous 2 appointments.  Pt initially did not think she had the energy to participate in another therapy eval.  Patient ambulated to OT clinic with use of a 4WW.  Patient demonstrating decreased activity tolerance, requiring she be pushed on 4WW seat half-way to the clinic.  Patient 's primary complaints include decreased balance, decreased coordination, BLE pain, and fatigue which impact I/ADL performance.  Patient's fine motor coordination (FMC) with bilateral UE appears impaired when compared to age/gender norms on 9 Hole Peg Test.  Patient's gross motor coordination (GMC) with bilateral UE appears impaired, per Box and Blocks test, when compared to age/gender norms.  Patient demonstrates bilateral deficit with  strength.  Patient demonstrates decreased activity tolerance which limits her ability to complete BADLs.  Per [place] Cognitive Assessment (MoCA), patient presents with cognitive impairment at this time.  Patient is appropriate for skilled OT services at this time to address above deficits in order to maximize independence with I/ADLs and decrease caregiver burden.      Recommendations: Patient is appropriate for 1:1 skilled OT at this  time.    PLAN  Frequency/Duration: 1-2 times per week, for 4 weeks;   Up to 8 additional visits    Long-term goals:    1. Patient will demonstrate large amplitude movements into I/ADL tasks, 3/4 trials to improve efficiency and safety.  2. Patient will demonstrate and verbalize understanding of work efficiency techniques related to PD to improve safety and decrease fatigue.  . 3Patient will verbalize understanding of 10 fall prevention strategies to improve safety during I/ADLs.  4. Patient will improve FMC per 9HPT by 5 seconds on RUE and 3 pegs on LUE to improve I/ADL independence.  5. Patient will improve GMC per B & B by 5 blocks to improve I/ADL independence.  6. Patient will participate in cognitive assess, for tx planning and recommendations re: I/ADLs.  7. Patient will demonstrate understanding of adaptive equipment and compensatory strategies to improve handwriting per patient/family report to improve functional independence.  8. Patient will verbalize and demonstrate understanding of home exercise program for coordination and strength with bilateral UE to improve function with I/ADLs.  9. Patient will demonstrate understanding of adaptive equipment/compensatory strategies 3/4 trials to improve self-feeding skills.  10. Patient will verbalize and demonstrate understanding of adaptive equipment to increase independence and safety with dressing.        Plan of Care: Self Cares / ADL Training, Communications with referral Source, patient, caregiver and treatment Team, Patient/ Family instruction: Etiology of diagnosis or presented symtoms, Treatment plan/rationale, Home Exercise Program and Expected Functional Outcomes , Cognitive Training, Neuromuscular Re-education, Therapeutic Exercise, Mobility/ Transfer Training, Compensatory Strategies and Adaptive Equipment/ DME.    Prognosis to achieve goals: Good    Goals were established with the patient: Yes    SUBJECTIVE  Pain: Yes  Bilateral calf and ankle  "pain    Past Medical History: Hip fx - 2016, R Hip replacement, R peroneal neuropathy, knee pain, macular degeneration, Anxiety, BPPV    Current Level of Function:  Lives with her  in an senior living apartment  Stairs required: No  Shopping: Patient's  completes; patient makes a list   Meals: Patient's  completes majority  Housework: Patient's  completes  Laundry: Patient's  completes  Driving: No  Medications: Patient uses pill box; patient's  organizes pills and provides meds when she needs to take them.  Medication On/Off times: denies  Finances: Patient's  manages  Sleep: Patient reports difficulty sleeping, wakes a lot  Leisure activities: word search  Assistive devices: 4WW (uses most often), FWW, has Rx for w/c but has not gotten yet  Fall history: Last Tuesday, slipped off of recliner; needed assistance from 2 people to get up from floor    Functional Performance during ADLs   Independent / Needs   Assist Comments   Eating Needs assistance Assistance with cutting   Grooming Independent    UB Dressing Needs assistance Assistance to hook bra   LB Dressing Needs assistance Assistance to don shoes   Bed Mobility Needs assistance Mod A from  to lift legs into bed  \"my legs are numb and heavy\"   Basic Transfers Needs occasional assistance Needs chairs with arms   Tub Transfers Needs assistance Tub/shower  Shower bench  Grab bar next to shower  Assistance with legs  Assistance to dry legs after shower   Toilet Transfers  No grab bars  High toilet   Meal Prep N/A      Handwriting/Keyboarding: \"It's bad, I can't read what its supposed to be\"    Motor Impairments:  Bilateral Tremor (L > R), bradykinesia, stooped posture              OBJECTIVE  U/E Motor Assessments: Dominant Upper Extremity: Right   L R   AROM Within Functional Limits Within Functional Limits   GMC Impaired Impaired   Strength   LUE: Grossly 4/5   RUE Grossly 4/5      L L Norms R R Norms   9 HPT " 5 pegs in 2 minutes Female 71+ years old: 24.11 seconds 1:16.97  Female 71+ years old: 22.49 seconds   Box & Blocks 23 Female 75+ years old: 63.6 blocks 32 Female 75+ years old: 65.0 blocks    27# Females 75+ years old: 37.6 lbs. 28# Females 75+ years old: 42.6 lbs.       Physical Performance Test (7 item): NT d/t time constraints    Cognitive Assessments:   MOCA: 12/30 (>26/30 is considered 'normal') - version 8.1  MOCA memory index score 9/15    Pt previously scored 19/30 in 2016.      Visual-Perceptual Skills:  NT d/t time constraints      Insurance: Medicare,  HCIN # [id number]; Provider #   Certification Dates: from 5/23/2019 to 8/14/19.      Physician Recommendation:  1. I certify the need for these services furnished within this plan and while under my care. I agree with the therapist's recommendation for plan of care.  2. If there is any recommendation for modification of therapy plan, please indicate below.    SARWAT Luong, OTR/L on 5/23/2019

## 2021-05-29 NOTE — PROGRESS NOTES
Speech Language/Pathology  Speech Therapy Outpatient Daily Visit Note    Diane Urbina  YOB: 1932     [id number]    Session 3 of 9    Medicare Patient   Provider #:   HICN #: [id number]  Certification Dates: 5/23/19 to 8/23/19    Subjective  Patient presents as alert and cooperative during this session.  An  was not applicable for this session.  Patient reports pain located in feet    Objective  Perform x1 vocal warm-ups while maintaining a minimum vocal intensity of 70 dB with mod cues.  -Patient achieves desired loudness 100% of time with min cues to improve vocal projection. Vocal quality is strained and hoarse.      Sustain phonation of /a/ for 5 seconds while maintaining a minimum of  71 dB with max cues.  -5 seconds at 74 dB with max cues and models. Strained vocal quality with pitch breaks.   -8 seconds at 73 dB with max cues and models. Strained vocal quality with pitch breaks.   -5 seconds at 77 db with mod cues and models. Clear then decreasing to strained      Recite numeric sequences while maintaining a minimum of 72 dB with mod cues.  -Patient achieves desired loudness 100% of time with max cues to improve vocal projection increasing to 100% with mod cues with and maintaining at 100% with no cues. Vocal quality fluctuates from clear to strained..      Read functional phrases and sentences while maintaining a minimum of 70 dB on at least 80% of time with mod cues.  -not addressed this session secondary to patient report blurry vision.      Produce functional phrase while maintaining a minimum of 70 dB on at least 80% of time YANI.  -Established functional phrases to improve vocal projection and loudness. Patient achieves 100% after models and maintaining at 100% YANI.     Phrases:   -Eleazar, I need help   -Bring me a cup of coffee please   -Don't change the channel  -Who left the door open?  -Did you lock the door?   -I need to get home to watch   Su       Assessment  Patient demonstrates ability to maintain vocal loudness with fading of cues and models. Vocal loudness improves with intentional voice and breath support, however variable improvements with vocal quality. Patient continues to be appropriate for voice therapy secondary to cooperation, responds well to cues, and maintenance with fading of cues. Patient still requires initial cues and models to adapt voice, continue with therapy.     Plan   Current goals remain appropriate    Time: 43 speech/language minutes    Diya Feldman MS, CCC-SLP

## 2021-05-29 NOTE — PROGRESS NOTES
"Physical Therapy  Physical Therapy  Movement Disorder Treatment Note    Date: 6/18/2019   Visit #: 5/10  Rx Units: TA 1; TE 3  Total OP Minutes: 55  MEDICARE  Certification Dates: from 5/23/2019  to 8/18/2019    Pain:  Yes   Location:  R LE (Laterally from ankle up to hip) \"Constant\" 8/10  Intervention:  Attempted to modify wt bearing activities to decrease pain      Subjective:  Pt continues to have elevated pain and chronic swelling in her R ankle though she does not seem to perseverate on it this date.  The pt acknowledges being tired this date.    , Eleazar present for treatment    Objective:  Therapeutic Exercise:  Total Minutes: 45  Sustained Movements (sitting)  4reps  x 1-2 sets      Exercise   Reps   Sets   Effort     1A Floor to Ceiling Without Flicking        1B Side to Side Without Flicking         Comments:   1A:     1B:     Other:   NuStep:  For aerobic and muscular conditioning:  Total Minutes:  15 minutes @ level #3, PRE:  5/10, avg METs:  1.5, avg SPM:  51  PT assists pt with set up and manually places feet on foot plates:  Seat # 8  Arms# 9    VCs to attempt to achieve SPM at >/= 60 SPM and pt maintain BIG movements      LE strengthening to address pt's significant weakness and to assist with ease of transfers and reduce burden of care:  -**Bridging with bolster under knees, 2 x 15 reps.  Pt able to demo activation of her glutes but is unable to clear her buttocks from the mat.  -**SAQ (B'ly) x 15 reps with ea LE.  Cues to achieve terminal knee extension and to engage quads with exercise.  Pt does well with this exercise.  -**SLR (B'ly) x 10 reps with L LE and 2 x 5 reps with R LE.  Cues for quad set prior to leg raise.  Pt demos quad lag, bilaterally and quickly fatigues on R side.  **Increased time to complete exercises above as pt is very slow with transitional movements and requires min assist with basic transfers due to weakness and instabilit    Neuro-Reeducation/Balance:  Total Minutes: " "     Gait Training:   Total Minutes:       Therapeutic Activity  Total Minutes: 10     Functional Movement  Rep  Sets  Effort    Rolling       Supine to Sit 2 1     Sit to Stand from standard chair See Below      Sit to Stand from low, soft chair       Sit and Reach       Walk and Turn       PWR Moves:       PWR Moves:        Comments:     Sit to supine with min A from PT.  Pt was hesitant with bed mobility and noted, \"My head can't go below my shoulders or everything goes squirrely!  I start to spin and I can throw up.\" Pt acknowledged that her spouse helps her at home with bed mobility.  PT assists with LE management due to weakness.    Sit <> Stands to FWW from elevated mat (25\"), 2 x 6 reps:, VCs to move one hand to walker when standing and one hand to mat when transitioning to sit.  Educated pt on benefits of wt shifting forward and \"pushing\" herself up vs pulling with both hands on walker.  VCs to look up when in standing, engage glutes and to think BIG to get up as pt maintains downward gaze with flexed posture. B knees positioned in valgus position.       Big Walking: With 4WW x 85 feet x 2  SBA VCs for BIG steps to achieve step through gait  Pt walks with flexed posture, downward gaze. With cuing pt able to achieve step through gait. Distance limited by pain    Other:     Carryover Assignment: Up BIG from w/c  HEP:  Clam Exercises (handout provided), 1A and 1B of BIG exercises (handouts provided);  B Hip Abduction with burag marquez, Bridging with MARCO serrano SLR    Assessment/Plan for week:  6/17/2019 - 6/21/2019:  The pt comes to PT with her 4WW and her spouse, who she relies on for stability with sit to stands and walking.  In session, the pt describes that she can experience dizziness/vertigo with bed mobility and if her \"head is below\" her shoulders, signs and symptoms being consistent with BPPV.  This author discusses this possibility and offers to treat it, should the patient want that and that " "she would need to get an order for PT with the diagnosis of BPPV.  PT discusses what the treatment entails and the pt is hesitant.  The pt's ongoing slowness and her compromised balance could be related to her BPPV as well.  PT encourages pt to think about it being addressed; she agrees she will \"think about it.\"  Today's session did focus on LE strengthening with the **'ed exercises above added to the pt's HEP so that is responsible for doing more at home.  The LE strengthening should assist with improving her sit to stand transfers and her bed mobility so that she doesn't have to rely on her spouse.  The pt continues to benefit from skilled 1:1 PT to address her physical and functional impairments described in this AP.      Functional Goals to be met by 10 visits  Patient will show improved efficiency and quality of movement, improved gait and postural stability for increased safety, decreased fall risk when performing ADL's, IADL's, household &/or community ambulation as measured by:                 360 degree turn < /= 13 steps,               TUG < /= 26 seconds,               30 Second Chair Stand > /=8 stands with UE support,               Transitional Mobility:                           bed mobility with SBA,                            Transfers from chair without arm rest with Hayder,               Gait speed > 0.48m/second, # steps in 6 meters < /=19 steps               report less than 4 falls per month,   Patient will ambulate > 200 feet in 6 minute with RPD< 5/10 to indicate improved functional activity tolerance for participating in social events &/or household/community ambulation.    Patient will be mod I/SBA with HEP  Patient Functional Goal: improved turns and walking in order to reach bathroom " easier                                                                                                                                                                                                                                                                                                                                                                                                                                                          Pt appeared frustrated with her pain this date.  Attempted to modify transfers to decrease pain however pain would transition elsewhere. Pt has significant muscular weakness contributing to arthritic pain. Pt also has swelling in R ankle but when asked pt reports she is not really doing anything for the swelling and doesn't know why she has it. Plan to continue to focus on LE strengthening in order to improve muscular strength which then will decrease joint discomfort and increase mobility.  Goals remain appropriate however progress will be slow d/t pt's age and lack of strength and increased pain.    Goals Established at [facility] on: 5/23/2019   360 degree turn < /= 13 steps,               TUG < /= 26 seconds,               30 Second Chair Stand > /=8 stands with UE support,               Transitional Mobility:                           bed mobility with SBA,                            Transfers from chair without arm rest with Hayder,               Gait speed > 0.48m/second, # steps in 6 meters < /=19 steps                report less than 4 falls per month,    Patient will ambulate > 200 feet in 6 minute with RPD< 5/10 to indicate improved functional activity tolerance for participating in social events &/or   household/community ambulation.     Patient will be mod I/SBA with HEP

## 2021-05-29 NOTE — PROGRESS NOTES
Speech Language/Pathology  Speech Therapy Outpatient Daily Visit Note    Diane Urbina  YOB: 1932     [id number]    Session 5 of 9    Medicare Patient   Provider #:   HICN #: [id number]  Certification Dates: 5/23/19 to 8/23/19    Subjective  Patient presents as alert and cooperative during this session.  An  was not applicable for this session.  Patient reports no pain   x1 notable observation of patient projecting voice at end of session when saying goodbye to therapist     Objective  Perform x1 vocal warm-ups while maintaining a minimum vocal intensity of 70 dB with mod cues.  -Patient achieves desired loudness 100% of time with min cues to improve vocal projection.      Sustain phonation of /a/ for 5 seconds while maintaining a minimum of  71 dB with max cues.  -6 seconds at 80 dB fading to 74 dB with max cues and models. Strained vocal quality with pitch breaks.   -6 seconds at 77 dB fading to 75 with max cues and models. Strained vocal quality  -9 seconds at 76 db with max cues and models. Strained vocal quality.   -10 seconds at 78 dB fading to 73 with max cues. Strained vocal quality and fluctuating levels of breath support     Recite numeric sequences while maintaining a minimum of 72 dB with mod cues.  -Patient achieves desired loudness 100% of time with min cues to improve vocal projection and maintaining at 100% with fading of cues to none. Vocal quality fluctuates from clear to strained. Noted to take moments of larger breath size to support vocal projection.     Read functional phrases and sentences while maintaining a minimum of 70 dB on at least 80% of time with mod cues.  -Patient achieves 70%-80% with mod cues. Noted to have difficulties with reading and patient reports blurry vision.      Produce functional phrase while maintaining a minimum of 70 dB on at least 80% of time YANI.  -Patient achieves 100% with initial min cues for breath support     Phrases:   -Eleazar, I  need help   -Bring me a cup of coffee please   -Don't change the channel  -Who left the door open?  -Did you lock the door?   -I need to get home to watch [employer]  -I'm going to the mall.   -All children and spouses names    Assessment  Patient demonstrates improvements with length of sustaining voicing for AHs and increasing intensity. She still requires max verbal cues and models to complete tasks. She demonstrates ability to recall and implement strategy of large breath size and project voice, however still requires cues. She continues to be appropriate for speech therapy to address strategies to improve communication secondary to management of symptoms in relation Parkinson's Disease.     Plan   Current goals remain appropriate    Time: 42 speech/language minutes    Diya Feldman MS, CCC-SLP

## 2021-05-29 NOTE — PROGRESS NOTES
"Physical Therapy  WHEELCHAIR: INITIAL EVALUATION   PHYSICAL THERAPY - OUTPATIENT       Eval Date: 2019   Rx Units:  W/C Management 3  Total OP Minutes: 50 minutes     SUBJECTIVE    Medical Diagnosis and Date: Parkinson's Disease  Referring MD: Dr. Deana Lane  Date of last MD visit: 2019  Treatment Diagnosis: Bradykinesia, Gait Instability, LE weakness, Decreased Functional Mobility  Height: 5' 7\"   Weight: 187 lbs  Precautions/PMH: BPPV,  OA in multiple joints, R TRINY 2015, L hip fracture 2016, R peroneal neuropathy, macular degeneration, anxiety    First movement disorder symptom presentation: tremors                 Date: a few years before diagnosis, unable to determine date  Non-motor symptoms: Visual impairments, Dementia, Anxiety, Orthostatic hypotension, Urinary symptoms, Constipation, Sleep disturbances and Fatigue      Living Situation: Apartment  Apt  Doorway/Room accessibility: Yes, Linoleum at entrance, kitchen, and bathroom  Carpet in living room and bedroom  Lives:  Eleazar  Caregiver Support:     Current/Future Model of Transportation: Car,  to assist transporting w/c   Where is w/c stored during transport? In  car    Hours spent in wheelchair: Plan to use it in the apt and the community to increase pt's safety and independence within her home ie access her kitchen to safely assist with meal prep, access all the rooms of her home including the bathroom safely and independently.  How is wheelchair used throughout the day? Currently only has 4WW  Current seating/mobility equipment: 4WW  Currently has NO WHEELCHAIR      Pain: Yes Ratin/10   Location: R Leg from hip to the foot with most of the pain along the lateral edge of shin and in  the knee and ankle  How does pain interfere with mobility?: Pain increases with any weight bearing limits gait stability and tolerance to any wt bearing activities    Patient s Goal: Want to be safe and increase her independence as right now " pt has to wait for  to assist with transfers    OBJECTIVE    Cognition: Memory: intact  Problem Solving: intact  Judgement: intact Attn/Concentration: intact  Vision: WFL - pt has macular degeneration Hearing: intact      Sensation: NT       Proprioception: NT   History of Skin Breakdown/Pressure Sores: No       History of Swelling:   Yes     Comments: In R LE especially in R ankle  Ability to Weight Shift: Yes        Strength and ROM: Strength taken in seated position  Lower Extremity Strength   L       R ROM L    R Comments Upper Extremity Strength    L      R     ROM L    R Comments   Hip Flexion 4+/5 [place] WFL  Shoulder Flexion 4+/5 [place] WFL    Hip Extension 4+/5 [place] WFL  Shoulder Extension 4+/5 Dk WFL    Hip Abduction 2/5 Dk WFL  Shoulder Abd 4+/5 [place] WFL    Knee Ext 4+/5 Dk Grossly -15 degrees Dk  Elbow Flexion 4+/5 [place] WFL    Knee Flexion 4+/5 [place] WFL  Elbow Ext 4+/5 [place] WFL    Dorsi Flexion 4+/5 Dk Limited by swelling in R ankle  Wrist Flexion 4+/5 [place] WFL    Plantar Flex 4+/5 [place] WFL Dk  Wrist Ext 4+/5 Dk WFL        Finger Ext/Flex Fair  dk WFL    Tone:    Lower Extremity: WFL [place]   Upper Extremity: WFL Dk    Balance:    Seated: Static: Independent  Dynamic: Independent  Standing: Static: SBA   Dynamic: CGA    Test/Score: 16/56 (per PT eval on 5/23/2019)    Posture:  Significantly increased thoracic kyphosis with cervical lordosis. Pt maintains a downward gaze due to poor posture    ADL Status:    Independent Assist Unable Comments   Dressing X    helps with lower body, under graments   Bathing X   Uses a shower seat.  helps her in and out of shower and he rinse her off and dries her.    Grooming/Hygiene   X     Feeding  X     Toileting X   Uses grab bars to help stability   Meal Prep    incontinent   Bowel/Bladder    Self-cathing in w/c?      Transfers:   Independent Assist Comments   Bed Mobility  Min A Slow to move, requires assist to reach full sidelying   W/C ? Chair  CGA  to close SBA Slow, uses UE support  VCs for hand placement for efficiency and safety   Car  NT      Gait:    Assistive Device: 4WW    Assistance: SBA    Distance: 100 fee  Gait Analysis: Significantly flexed posutre, slow paced, inconsistent step through gait, minimal foot clearance, downward gaze, no B heel strike  Gait Speed: 0.31 m/sec with 4WW (assessed at PT [facility] on 5/23/2019)    W/C Mobility Skills:     Independent  Assistance Comments/Tolerance (Fatigue, Dyspnea,Pain)   Manual W/C  SBA Standard chair: Pt able to propel with cues for hand placement for maximum push forward with the chair.  VCs to coordinate feet with hands to propel chair. Pt slow due to weight of chair.   Scooter      Power W/C        Other: NORMA Fontenot from [employer] was present to assist with w/c assessment. Pt's , Eleazar also present. It is recommended pt receives a light weight manual w/c for ease and efficiency of independent propulsion. Recommend brake extenders, a greater supportive back rest d/t pt's significant flexed posture, anti tippers and desk length arm rests so pt has a greater surface area to push up to and reach for with transfers.  Following w/c assessment,  PT will complete a Letter of Medical Necessity for equipment justification and accessories.

## 2021-05-30 NOTE — PROGRESS NOTES
"Physical Therapy  Physical Therapy  Movement Disorder Treatment Note  REASSESSMENT/ MEDICARE RE-CERTIFICATION    Date: 7/2/2019   Visit #: 9/10  Rx Units: TE 1; TA 3  Total OP Minutes: 60  MEDICARE  Certification Dates: from 5/23/2019  to 8/18/2019      Pain:  \"My leg hurts more today\"  Rated R LE pain 8/10   Location:  R LE .  Intervention: No interventions required this date as pain did not increase with any intervention performed      Subjective:  Pt reports getting her legs into bed remains difficult however sit to stands have  improved.  Pt reports she is going to go to [employer] to purchase knee high compression garments for the swelling in her legs. Instructed pt to get light to medium compression.    , Eleazar present for treatment    Objective:  Therapeutic Exercise:  Total Minutes: 15  Sustained Movements (sitting)  4reps  x 1-2 sets       Exercise   Reps   Sets   Effort     1A Floor to Ceiling Without Flicking        1B Side to Side Without Flicking         Comments:     Other:   NuStep:  For aerobic and muscular conditioning:  Total Minutes:  10 minutes Level #3,  Ave METS: 1.4    SPM: 71 PRE: 6/10     Assisted pt with set up: Seat set at # 10  Arms # 10     VCs for increased amplitude as pt would propel with short frequent steps.  VC to maintain SPM > 50 SPM      30 Second Chair Stand:  7.5 stands With UE support  From standard chair    At Eval: 6 stands   GOAL:   > /=8 stands with UE support    Neuro-Reeducation/Balance:  Total Minutes: Less than 5 minutes  Multidirectional Repetitive Movements.  8-16reps  x 1-2 sets     Step and Reach:   UE support:                     Chairs Nearby:        Exercise   Reps   Sets   Effort     2A Forward Step and Reach        2B Sideward Step and Reach        2C Backward Step and Reach         Comments: 2A and 2B adapted to sitting position         Gait Training:   Total Minutes: Combined with TA  Gait speed: with 4WW  0.52 m/second, 16 steps in 6 meters    At " "Eval:  .31 m/sec, 20 steps in 6 m    GOAL:   > 0.48m/second, # steps in 6 meters < /=19 steps        Therapeutic Activity  Total Minutes: 45     Functional Movement  Rep  Sets  Effort    Rolling to R only 5 2     Supine to Sit 2 1     Sit to Stand from standard chair       Sit to Stand from EOM 20\" 5 1     Sit and Reach       Lateral supine scooting 5 2      PWR Moves: seated marching      Supine hip/knee flexion \"marching\" 10 1 each      Comments:   360 degree turn = 12 steps     At Eval:  16 steps with 4WW     GOAL:   < /= 13 steps     TU.83 sec with 4WW    At eval:  34.11 sec  GOAL: < /= 26 seconds        Bed Mobility:  SBA with VCs to move sit to sidelying and for arm placement. Pt required Min A to Mod A for LEs with greater A required fro R LE.  Pt also complained of sizziness with transition   At Eval: NT'd    GOAL: SBA,                              Transfers:  Min A on third attempt from chair without arm rest; VC for hand placement   At [facility]: Mod A from armless chair   GOAL:   from chair without arm rest with Min A,    In order to improve amplitude of movements for ease of mobility and transfers:  -Rolling - From hookl isamar position VC and modeling  for increased effort and power with reach across body. VCs to roll slighly L to initiate power to roll R.  1st 2 reps pt required min A to complete roll with lower body however then progressed to SBA.   -Supine to sit - VCs for hand placement VC to roll to side with increased effort and power then kick legs off BIG while pushing up with UE to upright sitting position. SBA for mobility  -Sit to supine - VC to throw down body while kicking legs up then rolling to back. Pt very hesitant to \"throw self down\" d/t c/o dizziness.  Pt continued to struggle to get LEs up onto mat especially R LE. Pt required Min A for LEs     -Supine hip/knee flexion \"marching\" - VC for BIG leg lift and stomp on mat   -Lateral supine scooting - VC and modeling for BIG lateral march " "to side then bridge followed by bring second foot in. Pt able to lift hips off mat and move laterally in one motion.    Carryover Assignment: Up BIG from w/c  HEP:  Clam Exercises (handout provided), 1A and 1B of BIG exercises (handouts provided);  B Hip Abduction with fidel marquez, Bridging with maggie, SAQ, SLR    Functional Outcome Changes: Pt has met her  gait speed goal, TUG goal, 360 turn goal, and transfer goal.  She did not meet her 30 second sit sit to stand goal or her bed mobility goal,  however pt did demonstrate progress.      Evaluation: Goals Met? Partially   Since initiating therapy, pt's strength, balance and activity tolerance has improved.  Pt continues to complain of R LE pain from hip to foot with increased edema in both lower legs with R > L.  Instructed pt to look into purchasing new compression garments as she no longer wears hers \"as they are old and worn out.\"  This leg pain limits ease and ability to apply best effort with mobility.  Pt does report she is interested in continuing therapy in order to  improve her independence as her  often helps her get out of chair, don her clothes and steadies her during gait.  Plan to continue with PT as pt would highly benefit from improved aerobic and muscular strengthening along with amplitude training in order to increase independence, ease and efficiency with all functional mobility.      Patient seen from 5/23/2019 to 7/2/2019 for 9 treatment sessions.     Recommendation: Continue 1x/week for up to 8 visits    Functional Goals to be met by 8 visits or 10/1/2019  Patient will show improved efficiency and quality of movement, improved gait and postural stability for increased safety, decreased fall risk when performing ADL's, IADL's, household &/or community ambulation as measured by:                 -360 degree turn < /= 12 steps,               -TUG < /= 20 seconds,               -30 Second Chair Stand > /=8 stands with UE support,         "       -Transitional Mobility:                           Bed mobility with SBA,                            Transfers from chair without arm rest with SBA,              - Gait speed > 0.6m/second, # steps in 6 meters < /=15 steps               report less than 4 falls per month,    -Patient will ambulate > 200 feet in 6 minute with RPD< 5/10 to indicate improved functional activity tolerance for participating in social events &/or household/community ambulation.     -Patient will be mod I/SBA with HEP  Patient Functional Goal: improved turns and walking in order to reach bathroom easier  Goals were established with the patient: yes    Plan of Care  Communication with: referral Source, patient, caregiver and treatment Team, Patient / Family / Instruction: Etiology of diagnosis or presented symtoms, Treatment plan/rationale, Home Exercise Program and Expected Functional Outcomes, Therapeutic Exercise, Mobility / Transfer Training, Gait Training, Neuro Re-ed / Balance, Compensatory Strategies, Equipment Needs, Wheelchair management and Neuromuscular reeducation    Frequency/Duration:   1x/week for up to 8 visits or 10/1/2019    MEDICARE PATIENTS:  HICN # [id number]                Provider #   Certification Dates: from 7/3/2019 to 10/1/2019    Physician Recommendation:  1. I certify the need for these services furnished within this plan and while under my care. I agree with the therapist's recommendation for plan of care.    2. If there is any recommendation for modification of therapy plan, please indicate below.      Physician's Signature (Printed):  _____________________________      Functional Goals to be met by 10  visits                                                                                                                                                                                                                                                                                                                                                                                                                                                            Pt appeared frustrated with her pain this date.  Attempted to modify transfers to decrease pain however pain would transition elsewhere. Pt has significant muscular weakness contributing to arthritic pain. Pt also has swelling in R ankle but when asked pt reports she is not really doing anything for the swelling and doesn't know why she has it. Plan to continue to focus on LE strengthening in order to improve muscular strength which then will decrease joint discomfort and increase mobility.  Goals remain appropriate however progress will be slow d/t pt's age and lack of strength and increased pain.    Goals Established at [facility] on: 5/23/2019   360 degree turn < /= 13 steps,               TUG < /= 26 seconds,               30 Second Chair Stand > /=8 stands with UE support,               Transitional Mobility:                           bed mobility with SBA,                            Transfers from chair without arm rest with Hayder,               Gait speed > 0.48m/second, # steps in 6 meters < /=19 steps                report less than 4 falls per month,    Patient will ambulate > 200 feet in 6 minute with RPD< 5/10 to indicate improved functional activity tolerance for participating in social events &/or   household/community ambulation.     Patient will be mod I/SBA with HEP

## 2021-05-30 NOTE — PROGRESS NOTES
Speech Language/Pathology   Discharge Summary and Final Progress Note    Diane Urbina  YOB: 1932      [id number]   Referring Provider: Tamica Curiel*      Date of Onset: order in Epic in April 2019  Start of Care: 5/23/19  Date of Last Visit: 7/9/19  Medical Diagnosis: Parkinson's Disease  Treatment Diagnosis: dysarthia and voice        Medicare Patient   Provider #:   HICN #: [id number]  Certification Dates: 5/23/19 to 8/23/19    Summary of Goals and Functional Progress    Long-Term Goal  Patient will modify voice to improve vocal loudness and communication to meet home demands to maintain level of independence    Short-Term Goals  Perform x1 vocal warm-ups while maintaining a minimum vocal intensity of 70 dB with mod cues.  -Goal met: Patient demonstrates ability to maintain vocal loudness with fading of cues across sessions. Improved ability to project voice and adapt voice compared to evaluation.  -7/9/19: Patient achieves desired loudness 100% of time YANI.      Sustain phonation of /a/ for 5 seconds while maintaining a minimum of  71 dB with max cues.  -Goal met: Since evaluation, patient demonstrates ability to improve duration and intensity of sustained AH with fading of cues from max to mod. Patient requires verbal cues to maintain effort and energy to maintain high level of vocal intensity.     7/9/19:  -8 seconds at 74 dB with mod cues and models.   -10 seconds at 78 fading to 74 dB with mod cues.. Strained vocal quality  -7 seconds at 73 dB with mod cues. Strained vocal quality   -10 seconds at 78 dB with mod cues. Improved vocal quality for majority of time.      Recite numeric sequences while maintaining a minimum of 72 dB with mod cues.  -Goal met: Since evaluation, patient demonstrates ability to to improve vocal loudness and projection with fading of cues from max to no cues and improving from 80% to 100% of time. Patient demonstrates ability to maintain consistent  projection.     -7/9/19: Patient achieves desired loudness 100% of time with min cues for breath support and maintaining at 100% with no cues     Read functional phrases and sentences while maintaining a minimum of 70 dB on at least 80% of time with mod cues.  -Goal met:  Patient demonstrates ability to project voice with fading of cues at sentence level. Patient needs to read sentence first and then repeat with emphasis on vocal projection secondary to vision problems. Since evaluation, patient achieved desired loudness at 70% of time with max cues and now is 100% YANI.     -7/9/19: 10-12 word sentences: Patient achieves 100% YANI.     Produce functional phrase while maintaining a minimum of 70 dB on at least 80% of time YANI.  -Goal met: Since evaluation, functional phrases were established to promote vocal projection in simple contexts to meet needs at home, ie asking for help from  with high vocal intensity. Patient demonstrates ability to project voice with fading of cues.     -7/9/19: Patient achieves 100% YANI.      Phrases:   -Eleazar, I need help   -Bring me a cup of coffee please   -Don't change the channel  -Who left the door open?  -Did you lock the door?   -I need to get home to watch  Su  -I'm going to the mall.   -All children and spouses names        Plan   Discharge from speech   Home program         Diya Feldman MS, CCC-SLP      Physician Recommendation:  1. I certify the need for these services furnished within this jplan and while under my care.  I agree with the therapist's recommendtion for plan of care.  2. If there is any recommendation for modification of therapy plan, please indicate below.    Physician Signature: ____________________________________________

## 2021-05-30 NOTE — PROGRESS NOTES
"Physical Therapy  Physical Therapy  Movement Disorder Treatment Note    Date: 6/25/2019   Visit #: 7/10  Rx Units: TA 1; TE 3  Total OP Minutes: 60  MEDICARE  Certification Dates: from 5/23/2019  to 8/18/2019      Pain:  Yes   Location:  R LE (Laterally from ankle up to hip) Pain worse in the afternoon. 5/10 Pt describes pain as a dull ache. PT also mentions a feeling of numbness from knee to foot R worse than L.  Intervention: Pt was educated about how to obtain a compression sock as well as wearing instructions.      Subjective:  Pt continues to have elevated pain in R LE and chronic swelling in her R ankle. Pt reports feeling tired today and that she did not put her pain relief cream on her R leg prior to coming today.  , Eleazar present for treatment    Objective:  Therapeutic Exercise:  Total Minutes: 45  Sustained Movements (sitting)  4reps  x 1-2 sets not performed today      Exercise   Reps   Sets   Effort     1A Floor to Ceiling Without Flicking        1B Side to Side Without Flicking         Comments:   1A:     1B:     Other:   NuStep:  For aerobic and muscular conditioning:  Total Minutes:  20 minutes @ level #3, PRE:  5/10, avg METs: N/A, avg SPM:  N/A (averages not obtained, but pt SPM stayed mostly in the 30-40 SPM range).  PT assists pt with set up and manually places feet on foot plates:  Seat # 8  Arms# 9    VCs to attempt to achieve SPM at >/= 50 SPM and pt maintain BIG movements   Pt slows down her SPM on the nustep during conversation and history taking. Pt reported slowing down due to R LE pain.    LE strengthening to address pt's significant weakness and to assist with ease of transfers and reduce burden of care:  Seated;   LAQ x 20 reps/leg External cue to hand to be a target to kick as well as verbal cue of \"kick my hand\" used to increase focus during tasks as well as amplitude of movement. Pt lacked full knee extension 50% of reps, especially with fatigue  Supine    Bridges x 10 reps x 3 " sets  Educated pt on benefit of exercise  Max encouragement to reach full AROM with each rep  PT provided target for pt to reach. Pt able to achieve full clearance from mat this date without use of bolster   SLR x 10 reps/leg x 2 sets  After first rep observed decreased knee extension.  VCs and TCs to initiate quad set prior SLR with each rep. Pt continues to demonstrate quad lag actively. Passively, pt does not have full extension ROM.    Increased time to complete exercises above as pt is very slow with transitional movements and requires min assist with basic transfers due to weakness and instability    Neuro-Reeducation/Balance:  Total Minutes: 0     Gait Training:   Total Minutes: 0      Therapeutic Activity  Total Minutes: 15     Functional Movement  Rep  Sets  Effort    Rolling See below      Supine to Sit See Below      Sit to Stand from standard chair See Below      Sit to Stand from low, soft chair       Sit and Reach       Walk and Turn       PWR Moves:       PWR Moves:        Comments:   -Consulted with lymphedema therapist in regards to pt's increased LE swelling. Swelling is soft to touch and appears to be primarily be below the knees. Pt currently only wears a nylon sock that is just above the ankle. Pt reports swelling is improved in the AM after lying down.  Pt reports she has compression stockings at home but doesn't wear them. Instructed pt to wear them the next time pt comes to PT to see if pain and swelling changes with use.  Educated pt the benefits and purpose of compression stockings.    Sit to supine  Initially provided VCs and modeling to go down on elbow and move slowly into sidelying position. Pt required Min A to bring her legs up to mat. Pt did not initiate laying shoulder down on mat. Pt appeared fearful of not only of falling but of dizziness  VCs required to move from side-lying to supine and to center self at midline. No active initiation observed by pt to self center on  mat      Supine to sit PT provided verbal cues for hand and foot placement as well as min A at the pelvis to initiate an efficient roll to get pt into side lying. Pt was able to use both of her UE to push up into seated once her lower extremities were off the mat.     Sit <> Stands to 4WW Decreased anterior wt shift and heavy reliance on UE support. Pt able to complete stand without physical assistance.  Pt would move R foot forward prior to standing to prevent ankle discomfort however this causes greater effort to achieve forward shift to stand   Other:     Carryover Assignment: Up BIG from w/c  HEP:  Clam Exercises (handout provided), 1A and 1B of BIG exercises (handouts provided);  B Hip Abduction with fidel marquez, Bridging with MARCO serrano SLR    Assessment/Plan for week:  6/24 - 6/28/2019:  Discussed and reviewed pt's POC.  Pt reported she used to exercise all the time and wants to be able to walk extended distances however the leg pain and fatigue limits her. Pt's  asked about decreasing frequency to 1x/week. Decided to complete current POC, then reassess at 10th visit with plan to continue PT at 1x/week  to improve strength for greater ease with transitional movements and gait. Patient is limited by her decreased focus on tasks that can lead to difficulties making big purposeful and effortful movements. Pt attempted to increase focus on tasks by using external targets to improve movement amplitude as well as verbal cues to keep patient paying attention to her task. Pt appeared to be able to perform all interventions if encouraged and given the time to complete each task.   at times is quick to help pt and pt freely takes help. Progress is slow due to patients decreased effort and amplitude with all mobility, however the pt continues to benefit from skilled 1:1 PT to address her physical and functional impairments described in this AP.    SPT under direct supervision of PT with PT directing  treatment and plan of care.  Cheyenne Maurer, SPT  Ana Alicea, PT        Functional Goals to be met by 10 visits  Patient will show improved efficiency and quality of movement, improved gait and postural stability for increased safety, decreased fall risk when performing ADL's, IADL's, household &/or community ambulation as measured by:                 360 degree turn < /= 13 steps,               TUG < /= 26 seconds,               30 Second Chair Stand > /=8 stands with UE support,               Transitional Mobility:                           bed mobility with SBA,                            Transfers from chair without arm rest with Hayder,               Gait speed > 0.48m/second, # steps in 6 meters < /=19 steps               report less than 4 falls per month,   Patient will ambulate > 200 feet in 6 minute with RPD< 5/10 to indicate improved functional activity tolerance for participating in social events &/or household/community ambulation.    Patient will be mod I/SBA with HEP  Patient Functional Goal: improved turns and walking in order to reach bathroom easier                                                                                                                                                                                                                                                                                                                                                                                                                                                          Pt appeared frustrated with her pain this date.  Attempted to modify transfers to decrease pain however pain would transition elsewhere. Pt has significant muscular weakness contributing to arthritic pain. Pt also has swelling in R ankle but when asked pt reports she is not really doing anything for the swelling and doesn't know why she has it. Plan to continue to focus on LE strengthening in order to improve muscular  strength which then will decrease joint discomfort and increase mobility.  Goals remain appropriate however progress will be slow d/t pt's age and lack of strength and increased pain.    Goals Established at [facility] on: 5/23/2019   360 degree turn < /= 13 steps,               TUG < /= 26 seconds,               30 Second Chair Stand > /=8 stands with UE support,               Transitional Mobility:                           bed mobility with SBA,                            Transfers from chair without arm rest with Hayder,               Gait speed > 0.48m/second, # steps in 6 meters < /=19 steps                report less than 4 falls per month,    Patient will ambulate > 200 feet in 6 minute with RPD< 5/10 to indicate improved functional activity tolerance for participating in social events &/or   household/community ambulation.     Patient will be mod I/SBA with HEP

## 2021-05-30 NOTE — PROGRESS NOTES
Speech Language/Pathology  Speech Therapy Outpatient Daily Visit Note    Diane Urbina  YOB: 1932     [id number]    Session 6 of 9    Medicare Patient   Provider #:   HICN #: [id number]  Certification Dates: 5/23/19 to 8/23/19    Subjective  Patient presents as alert and cooperative during this session.  An  was not applicable for this session.  Patient reports pain located in R leg    Objective  Perform x1 vocal warm-ups while maintaining a minimum vocal intensity of 70 dB with mod cues.  -Patient achieves desired loudness 100% of time YANI.      Sustain phonation of /a/ for 5 seconds while maintaining a minimum of  71 dB with max cues.  -6 seconds at 876 dB with mod cues and models. Strained vocal quality.  -8 seconds at 81 dB fading to 77 with mod cues.. Strained vocal quality  -7 seconds at 81 dB with mod cues. Strained vocal quality at end of task  -10 seconds at 80 dB fading to 78 with max cues. Strained vocal quality and fluctuating levels of breath support.      Recite numeric sequences while maintaining a minimum of 72 dB with mod cues.  -Patient achieves desired loudness 100% of time with min cues to improve vocal projection and maintaining at 100% with fading of cues to none. Vocal quality fluctuates from clear to strained secondary to breath support.      Read functional phrases and sentences while maintaining a minimum of 70 dB on at least 80% of time with mod cues.  -Patient achieves 80% with min cues increasing to 100% with repetition of task.     Produce functional phrase while maintaining a minimum of 70 dB on at least 80% of time YANI.  -Patient achieves 100% with initial min cues for vocal projection fading to none. Patient is observed to ask  x3 different questions above desired loudness level YANI.     Phrases:   -Eleazar, I need help   -Bring me a cup of coffee please   -Don't change the channel  -Who left the door open?  -Did you lock the door?   -I need to get  home to watch [employer]  -I'm going to the mall.   -All children and spouses names    Assessment  Patient demonstrates ability to adapt vocal loudness by projecting voice to improve how well patient is heard. She demonstrates ability to adapt voice and maintain with fading of cues during structured tasks and signs of generalization to functional tasks based on observations during session. Further reinforcement of strategy of projecting voice is appropriate.      Plan   Current goals remain appropriate    Time: 42 speech/language minutes    Diya Feldman MS, CCC-SLP

## 2021-05-30 NOTE — PROGRESS NOTES
"Physical Therapy  Physical Therapy  Movement Disorder Treatment Note    Date: 6/27/2019   Visit #: 8/10  Rx Units: 1 - TE, 1- NR, 2- TA  Total OP Minutes: 55  MEDICARE  Certification Dates: from 5/23/2019  to 8/18/2019      Pain:  No complaints this date.   Location:  [facility] .  Intervention: ---      Subjective:  Patient and  deny falls in the past month.  Pt's  continues to assist/walk with patient to bathroom and moving around the house especially as the day progresses d/t to fall risk.   , Eleazar present for treatment    Objective:  Therapeutic Exercise:  Total Minutes: 15  Sustained Movements (sitting)  4reps  x 1-2 sets       Exercise   Reps   Sets   Effort     1A Floor to Ceiling Without Flicking        1B Side to Side Without Flicking         Comments:       Other:   NuStep:  For aerobic and muscular conditioning:  Total Minutes:  15 minutes level #3,  Ave METS: 1.5  SPM: 51 PRE: 4/10 Therapist assisted with set up and VC for SPM >55 consistently.         Neuro-Reeducation/Balance:  Total Minutes: 10  Multidirectional Repetitive Movements.  8-16reps  x 1-2 sets     Step and Reach:   UE support:                     Chairs Nearby:        Exercise   Reps   Sets   Effort     2A Forward Step and Reach   10 1 each 5     2B Sideward Step and Reach   10 1 each 5     2C Backward Step and Reach            Comments: 2A and 2B adapted to sitting position  2A - VC for increased step height and BIG return to center as well as modeling for coordination of arms. Attempted alternating however pt had difficulty therefore returned to uilateral  2B- VC for increased step length to side and BIG reach with both arms. Attempted alternating, however unsuccessful.         Gait Training:   Total Minutes:       Therapeutic Activity  Total Minutes: 30     Functional Movement  Rep  Sets  Effort    Rolling to L only 10 1 8    Supine to Sit 5 1 7    Sit to Stand from standard chair       Sit to Stand from EOM 20\" 5 1     " Sit and Reach       Lateral supine scooting 8 1 each 8    PWR Moves: seated marching 20 1 5   Supine hip/knee flexion 10 1 each 6     Comments:   In order to improve amplitude of movements for ease of mobility and transfers:  -rolling - VC for increased effort and power with reach across body. Pt provided target to increased ROM and amplitude. Pt required min A initially and progressed to SBA.   -supine to sit - VC to roll to side with increased effort and power then kick legs off BIG while pushing up with UE to upright sitting position. SBA for mobility  -sit to supine - VC to throw down body while kicking legs up then rolling to back. Pt required min assistance initially with progression to SBA. VC for increased effort with leg lift  -sit to stand from EOM- VC to scoot to EOM, appropriate TIFFANIE and feet back, cues for increased anterior weight shift and dive forward with shoulders and head. Additional VC for increased power and effort to fully stand to 4WW.  SBA to CGA. Increased cues for more power and effort as reps progressed  -Seated marching - VC for increased ROM and power as well as BIG posture.   -Supine hip/knee flexion - VC for BIG left lift and stomp bug on mat with foot  -Lateral supine scooting - VC for BIG lateral march to side then bridge followed by bring second foot in. Pt able to lift hips off mat and move laterally in one motion. Intermittent VC for BIG effort      BIG walking 2 x 150 feet with 4WW and SBA - VC for upright posture, increased step length, and upright gaze.   Quarter and half turns with 4WW, SBA x 5 reps each direction - VC and TC to turn walker then step BIG into walker.  Pt tends to turn feet and body then 4WW leading to instability. CGA for safety.         Other:     Carryover Assignment: Up BIG from w/c  HEP:  Clam Exercises (handout provided), 1A and 1B of BIG exercises (handouts provided);  B Hip Abduction with fidel marquez, Bridging with MARCO serrano SLR    Assessment/Plan  for week:  6/24 - 6/28/2019:  Patient continues to required cues for increased power and effort with all activities, however was able to improve independence and ease of mobility - transfers and bed mobility as effort and reps progressed.  She was challenged by coordination of arm and leg movements during adapted seated 2A and 2B exercises this date, however did demonstrate improved amplitude as reps progressed.  Patient and  were encouraged to increase independence with functional mobility and effort at home as she is able to demonstrate these tasks in the clinic. Patient continues to demonstrate weakness, decreased weight shifting and upright tolerance which leads to increased reliance on walker, stress on R knee and fall risk. Pt benefit from skilled 1:1 PT to continue addressing impairments and progressing functional mobility independence and reduce falls.  Will perform sit to stand from standard chair height from either EOM or chair next session using same technique and focus on functional turns.       Functional Goals to be met by 10 visits  Patient will show improved efficiency and quality of movement, improved gait and postural stability for increased safety, decreased fall risk when performing ADL's, IADL's, household &/or community ambulation as measured by:                 360 degree turn < /= 13 steps,               TUG < /= 26 seconds,               30 Second Chair Stand > /=8 stands with UE support,               Transitional Mobility:                           bed mobility with SBA,                            Transfers from chair without arm rest with Hayder,               Gait speed > 0.48m/second, # steps in 6 meters < /=19 steps               report less than 4 falls per month,   Patient will ambulate > 200 feet in 6 minute with RPD< 5/10 to indicate improved functional activity tolerance for participating in social events &/or household/community ambulation.    Patient will be mod I/SBA with  HEP  Patient Functional Goal: improved turns and walking in order to reach bathroom easier                                                                                                                                                                                                                                                                                                                                                                                                                                                          Pt appeared frustrated with her pain this date.  Attempted to modify transfers to decrease pain however pain would transition elsewhere. Pt has significant muscular weakness contributing to arthritic pain. Pt also has swelling in R ankle but when asked pt reports she is not really doing anything for the swelling and doesn't know why she has it. Plan to continue to focus on LE strengthening in order to improve muscular strength which then will decrease joint discomfort and increase mobility.  Goals remain appropriate however progress will be slow d/t pt's age and lack of strength and increased pain.    Goals Established at [facility] on: 5/23/2019   360 degree turn < /= 13 steps,               TUG < /= 26 seconds,               30 Second Chair Stand > /=8 stands with UE support,               Transitional Mobility:                           bed mobility with SBA,                            Transfers from chair without arm rest with Hayder,               Gait speed > 0.48m/second, # steps in 6 meters < /=19 steps                report less than 4 falls per month,    Patient will ambulate > 200 feet in 6 minute with RPD< 5/10 to indicate improved functional activity tolerance for participating in social events &/or   household/community ambulation.     Patient will be mod I/SBA with HEP

## 2021-05-30 NOTE — PROGRESS NOTES
"Physical Therapy  Physical Therapy  Movement Disorder Treatment Note      Date: 7/23/2019   Visit #: 1/8 (10)  Rx Units: TE 2; TA 1; NR 1  Total OP Minutes: 60  MEDICARE  Certification Dates: from 7/3/2019 to 10/1/2019      Pain:  Pt reported both of her ankles are more swollen this date, however pt did not rate LE pain this date   Location:  [facility] .  Intervention: --      Subjective:  Pt reports fatigue and stiffness are her greatest barrier.  Pt and  remain frustrated over LE swelling as swelling increases discomfort with transfers and ambulation. Pt did say elevated her LEs decreases the swelling. This PT encouraged pt to talk to her DR about her swelling and to continue to elevate LEs    , Eleazar present for treatment    Objective:  Therapeutic Exercise:  Total Minutes: 25  Sustained Movements (sitting)  4reps  x 1-2 sets       Exercise   Reps   Sets   Effort     1A Floor to Ceiling Without Flicking 4 2 6/10     1B Side to Side Without Flicking 4 2 6/10      Comments:   1A:  VCs for initial wide stance and supination of forearm with \"back BIG\"Pt tends to IR at L hip VCs for \"BIG KNEEs\"  1B:  More challenging to achieve trunk rotation Poor lower body rotation  VCs for supination of forearm and move gaze to over the hand to encourage rotation    Other:   NuStep:  For aerobic and muscular conditioning:  Total Minutes:  10 minutes Level #3,  Ave METS: 1.4    SPM: 71 PRE: 6/10     Assisted pt with set up: Seat set at # 9  Arms # 10     VCs for increased amplitude.  Attempted to cease conversation as pt would become easily distracted and slow pace and movement when talking.  VC to maintain SPM > 50 SPM        Neuro-Reeducation/Balance:  Total Minutes: 20  Multidirectional Repetitive Movements.  8-16reps  x 1-2 sets     Step and Reach:   UE support:                     Chairs Nearby:        Exercise   Reps   Sets   Effort     2A Forward Step and Reach   10 2 each 7     2B Sideward Step and Reach   10 2 each 7 " "    2C Backward Step and Reach            Comments: 2A and 2B adapted to sitting position  One side at a time  2A - VC for increased step height and step forward. Pt tends to just hip flex without any knee flex/ext to move her step.  VCs for improved shoulder retraction and BIG return to center as well as modeling for coordination of arms.     2B- VC for increased step length to side and BIG reach with both arms. Pt able to achieve BIG reach but VCs and 25% shaping with improved rotaition as pt significantly lacks trunk rotation          Gait Training:   Total Minutes:       Therapeutic Activity  Total Minutes: 15     Functional Movement  Rep  Sets  Effort    Rolling to R only       Supine to Sit       Sit to Stand from standard chair       Sit to Stand from EOM 20\"       Sit and Reach       Lateral supine scooting       PWR Moves: seated marching      Supine hip/knee flexion \"marching\"        Comments:   BIG Walking with 4WW: x 300 feet taking 2 standing rest breaks TCs and VCs to improve hip extension and trunk extension  Pt able to consistently achieve step through gait with cuing but continues to maintain a flexed posture. Pt can obtain a better upright posture with TC and Min A for trunk and cervical extension but unable to sustain it.    Quarter and half turns with 4WW, SBA x 5 reps each direction - VC and TC to turn walker then step BIG into walker.  Pt had difficulty achieve a BIG step to complete pivot step in 2 steps.  Attempted to complete side stepping and turns with forearm support from PT but pt was very nervous about falling. However encouragement pt able to complete 5 steps to L and R but requiring cuing for BIGGER steps.         Sit to Stands throughout treatment. VCs for hand placement as pt places both hands on walker.  VCs for forward wt shift and momentum. Placed walker slightly out in front of patient so pt would have to wt shift further to reach. Pt able to  one attempt 80% of the " time.    Carryover Assignment: Up BIG from w/c  HEP:  Clam Exercises (handout provided), 1A and 1B of BIG exercises (handouts provided);  B Hip Abduction with fidel marquez, Bridging with MARCO serrano SLR    Assessment/Plan for week:  7/22 - 7/26/2019:  Pt returns to PT following last treatment completed on 7/2/2019 when reassessment and revision of POC occurred.  Pt reports she will be seeing Dr Lane early next week and it was discussed to have Dr Lane review pt's LE swelling in hopes to provide some relief as pt's swelling causes pain with wt bearing. Pt continues to fatigue easily especially with walking however this appears from overall deconditioning. Pt's amplitude of all transfers is impaired and pt reports she continues to lack the ability  to lift her legs up onto the bed but it is improving as she states it is now easier to get up and out of bed.  Pt would benefit from continued strengthening, aerobic conditioning of functional mobility to improve safety and efficiency with mobility in her home and community.    Revised Goals as of 7/2/2019:                 -360 degree turn < /= 12 steps,               -TUG < /= 20 seconds,               -30 Second Chair Stand > /=8 stands with UE support,               -Transitional Mobility:                           Bed mobility with SBA,                            Transfers from chair without arm rest with SBA,              - Gait speed > 0.6m/second, # steps in 6 meters < /=15 steps               report less than 4 falls per month,    -Patient will ambulate > 200 feet in 6 minute with RPD< 5/10 to indicate improved functional activity tolerance for participating in social events &/or household/community ambulation.     -Patient will be mod I/SBA with HEP  Patient Functional Goal: improved turns and walking in order to reach bathroom easier  Goals were established with the patient:  yes                                                                                                                                                                                                                                                                                                                                                                                                                                                              Pt appeared frustrated with her pain this date.  Attempted to modify transfers to decrease pain however pain would transition elsewhere. Pt has significant muscular weakness contributing to arthritic pain. Pt also has swelling in R ankle but when asked pt reports she is not really doing anything for the swelling and doesn't know why she has it. Plan to continue to focus on LE strengthening in order to improve muscular strength which then will decrease joint discomfort and increase mobility.  Goals remain appropriate however progress will be slow d/t pt's age and lack of strength and increased pain.    Goals Established at [facility] on: 5/23/2019   360 degree turn < /= 13 steps,               TUG < /= 26 seconds,               30 Second Chair Stand > /=8 stands with UE support,               Transitional Mobility:                           bed mobility with SBA,                            Transfers from chair without arm rest with Hayder,               Gait speed > 0.48m/second, # steps in 6 meters < /=19 steps                report less than 4 falls per month,    Patient will ambulate > 200 feet in 6 minute with RPD< 5/10 to indicate improved functional activity tolerance for participating in social events &/or   household/community ambulation.     Patient will be mod I/SBA with HEP

## 2021-05-30 NOTE — PROGRESS NOTES
Speech Language/Pathology  Speech Therapy Outpatient Daily Visit Note    Diane Urbina  YOB: 1932     [id number]    Session 8 of 9; error noted in count and number adjusted to reflect current number in POC     Subjective  Patient presents as alert and cooperative during this session.  An  was not applicable for this session.  Patient reports pain at 6 out of 10 and pain located in R leg    Objective  Perform x1 vocal warm-ups while maintaining a minimum vocal intensity of 70 dB with mod cues.  -Patient achieves desired loudness 100% of time YANI.      Sustain phonation of /a/ for 5 seconds while maintaining a minimum of  71 dB with max cues.  -9 seconds at 76 dB with mod cues and models.   -8 seconds at 76 dB with mod cues.. Strained vocal quality  -10 seconds at 79 dB with mod cues. Strained vocal quality at end of task  -8 seconds at 80 dB fading to 78 with mod cues. Strained vocal quality.     Recite numeric sequences while maintaining a minimum of 72 dB with mod cues.  -Patient achieves desired loudness 100% of time with min cues to improve vocal projection and maintaining at 100% with fading of cues to none.     Read functional phrases and sentences while maintaining a minimum of 70 dB on at least 80% of time with mod cues.  -Patient achieves 100% YANI.     Produce functional phrase while maintaining a minimum of 70 dB on at least 80% of time YANI.  -Patient achieves 100% YANI. Patient is observed to ask  x2 different questions above desired loudness level YANI.     Phrases:   -Eleazar, I need help   -Bring me a cup of coffee please   -Don't change the channel  -Who left the door open?  -Did you lock the door?   -I need to get home to watch [employer]  -I'm going to the mall.   -All children and spouses names    Assessment  Patient continues to progress in all areas with improving vocal intensity and loudness to manage how well she is heard secondary to diagnosis. Patient continues to  require initial support for projection and maintains well with fading of cues. Vocal quality continues to fluctuate from harsh to clear secondary to varying levels of effort to project voice and breath support.     Plan   Discharge at next session    Time: 43 speech/language minutes    Diya Feldman MS, CCC-SLP

## 2021-05-31 NOTE — PROGRESS NOTES
"Physical Therapy  Physical Therapy  Movement Disorder Treatment Note      Date: 8/20/2019   Visit #: 5/8 (14)  Rx Units: 2 - TE, ,2 - TA  Total OP Minutes: 55  MEDICARE  Certification Dates: from 7/3/2019 to 10/1/2019      Pain: NR - pt reported generalized knee stiffness  Location:  B knees  Intervention: NuStep and rest prn      Subjective:  Patient reports generalized stiffness this date and pt's  noted poor sleep last night.  Overall, they report that transfers and turns have improved since beginning therapy.   , Eleazar present for treatment    Objective:  Therapeutic Exercise:  Total Minutes: 30  Sustained Movements (sitting)  4reps  x 1-2 sets       Exercise   Reps   Sets   Effort     1A Floor to Ceiling Without Flicking        1B Side to Side Without Flicking         Comments:   1A:    1B:      Other:   NuStep:  For aerobic and muscular conditioning:  Assisted pt with set up: Assisted R foot onto step and adjusted seat and arms for optimal positioning: Seat set at # 7  Arms # 10   VC for increased effort and power with movements.  Cues for SPM >50.  Total Minutes:  15 minutes Level #3,  Ave METS: 1.7    SPM: 58 PRE: 5/10      Strengthening for improved gait and functional mobility:  Seated:   Hip Abduction with Green Tband x 15 reps with 5 sec holds - VC to maintain max abduction during isometric hold   Marching with 5# cuff weights 2 x 10 reps B  - VC for BIG effort and power. Pt able to clear foot consistently   Hip flexion with Hip Abdcution \"Lift up and out!\" x 10 reps with 5# cuff weights Dk and stepping over cane on ground. Modeling for sequencing.  Decreased ROM on R side with max cues to step over cane during last 3 reps.   Core Strengtheing for postural stability:   Seated resisted cane perturbations \"Don't let me push you over\" Holding 10 sec x 5 reps A/P then 5 reps rotation with 10 seconds.   VC to keep elbows next to sides and breathing technique.      " "    Neuro-Reeducation/Balance:  Total Minutes:   Multidirectional Repetitive Movements.  8-16reps  x 1-2 sets     Step and Reach:   UE support:                     Chairs Nearby:        Exercise   Reps   Sets   Effort     2A Forward Step and Reach        2B Sideward Step and Reach        2C Backward Step and Reach         Comments: 2A and 2B adapted to sitting position  One side at a time  2A -   2B-          Gait Training:   Total Minutes:       Therapeutic Activity  Total Minutes: 25     Functional Movement  Rep  Sets  Effort    Rolling to R only       Supine to Sit       Sit to Stand from standard chair       Sit to Stand from EOM 20\"       Sit and Reach       Lateral supine scooting       PWR Moves: seated marching      Supine hip/knee flexion \"marching\"        Comments:   In order to improve independence with functional mobility:    Sit to stand from EOM 2 x 5 reps - VC for hand placement, scoot to EOM, place left foot back then increased effort with anterior weight shift. Min to CGA with first set; SBA during second set.     BIG Walking with 4WW 75 feet x 2 +50 feet with SBA. VC for increased step length and upright posture no seated rest break needed.      Pivot Turns x 4 reps with 4WW mat <> standard chair  - VC for proper hand placement and locking breaks.  Pt demonstrated good step height this date. Frequent cues for walker safety.     360 degree turns with 4WW - 13-14 steps CW x 3 reps;  16 steps CCW x 2 reps - VC for increased step height. Pt tends to make a very large turn therefore increased # of steps when turning CCW.     Carryover Assignment: Up BIG from w/c  HEP:  Clam Exercises (handout provided), 1A and 1B of BIG exercises (handouts provided);  B Hip Abduction with fidel tband (also issued Green Tband), Bridging with MARCO serrano SLR, Instructed pt to walk every hour around her home (ie dining room table or down hallway of building)  HIp flex with Hip Abduction \"Straddle " "stepping\"    Assessment/Plan for week:  8/19 -8/23//2019:  Patient demonstrated increased activity tolerance on NuStep and would benefit from trial of increased resistance next session.  She was able to tolerate added resistance with LE strengthening, however fatigued quickly.  Patient continues to require cues for proper set up for basic transfers in order to decrease external assistance by  and increase safety with use of walker.  Patient continues to lack amplitude and effort as well as functional strength to increase efficiency of transfers and mobility, therefore continued PT is indicated.  Current POC and goals remain appropriate.  Plan to continue working on functional turns, mobility and strengthening.       Revised Goals as of 7/2/2019:                 -360 degree turn < /= 12 steps,               -TUG < /= 20 seconds,               -30 Second Chair Stand > /=8 stands with UE support,               -Transitional Mobility:                           Bed mobility with SBA,                            Transfers from chair without arm rest with SBA,              - Gait speed > 0.6m/second, # steps in 6 meters < /=15 steps               report less than 4 falls per month,    -Patient will ambulate > 200 feet in 6 minute with RPD< 5/10 to indicate improved functional activity tolerance for participating in social events &/or household/community ambulation.     -Patient will be mod I/SBA with HEP  Patient Functional Goal: improved turns and walking in order to reach bathroom easier  Goals were established with the patient: " yes                                                                                                                                                                                                                                                                                                                                                                                                                                                              Pt appeared frustrated with her pain this date.  Attempted to modify transfers to decrease pain however pain would transition elsewhere. Pt has significant muscular weakness contributing to arthritic pain. Pt also has swelling in R ankle but when asked pt reports she is not really doing anything for the swelling and doesn't know why she has it. Plan to continue to focus on LE strengthening in order to improve muscular strength which then will decrease joint discomfort and increase mobility.  Goals remain appropriate however progress will be slow d/t pt's age and lack of strength and increased pain.    Goals Established at [facility] on: 5/23/2019   360 degree turn < /= 13 steps,               TUG < /= 26 seconds,               30 Second Chair Stand > /=8 stands with UE support,               Transitional Mobility:                           bed mobility with SBA,                            Transfers from chair without arm rest with Hayder,               Gait speed > 0.48m/second, # steps in 6 meters < /=19 steps                report less than 4 falls per month,    Patient will ambulate > 200 feet in 6 minute with RPD< 5/10 to indicate improved functional activity tolerance for participating in social events &/or   household/community ambulation.     Patient will be mod I/SBA with HEP

## 2021-05-31 NOTE — PROGRESS NOTES
"Physical Therapy  Physical Therapy  Movement Disorder Treatment Note      Date: 9/3/2019   Visit #: 7/8 (16)  Rx Units: 2- TE, 2- TA  Total OP Minutes: 55  MEDICARE  Certification Dates: from 7/3/2019 to 10/1/2019    Pain: 7/10  Location:  R LE  Intervention: rest prn    Subjective:  Patient reports that she has been walking on a daily basis.  Over the past month, patient has not had a fall. In fact, she reports no falls since PT began.  Since beginning therapy, she has been able to get to the bathroom easier and is able to walk and perform turns with her walker with increase ease.  Patient reports increased R leg discomfort this date   , Eleazar present for treatment.    Objective:  Therapeutic Exercise:  Total Minutes: 25  Sustained Movements (sitting)  4reps  x 1-2 sets       Exercise   Reps   Sets   Effort     1A Floor to Ceiling Without Flicking        1B Side to Side Without Flicking           Other:   NuStep:  For aerobic and muscular conditioning:  Assisted pt with set up: adjusted seat and arms for optimal positioning and position R foot on pedal: Seat set at # 7  Arms # 10. VC for increased effort and power with each movement.  Cues for SPM >60.  Total Minutes:  15 minutes Level #4,  Ave METS: 1.9    SPM: 58 PRD: 3/10      Strengthening for improved gait stability and functional mobility:  Seated at edge of EOM to engage core:   Marching with 5# cuff weights x 20 reps  - VC to maintain consistent ROM and increase effort with R LE   Hip flexion with Hip Abduction \"Lift up and out!\" x 10 reps with 5# cuff weights B. Improved amplitude this date.        Neuro-Reeducation/Balance:  Total Minutes:      Step and Reach:   UE support:                     Chairs Nearby:        Exercise   Reps   Sets   Effort     2A Forward Step and Reach        2B Sideward Step and Reach        2C Backward Step and Reach         Comments: 2A and 2B adapted to sitting position  One side at a time  2A -   2B-       Gait Training: " "  Total Minutes:     Therapeutic Activity  Total Minutes: 30     Functional Movement  Rep  Sets  Effort    Rolling to R only 10 1     Supine to/from Sit 10 1     Sit to Stand from standard chair       Sit to Stand from EOM 18\" 6 1     Sit and Reach       Lateral supine scooting       PWR Moves: seated marching      Supine hip/knee flexion \"marching\"        Comments:   In order to improve amplitude of movements for ease of mobility and transfers:  -rolling - VC for increased effort and power with reach across body. Pt provided target to increased ROM and amplitude. Pt required SBA  -supine to sit - VC to roll to side with increased effort and power then kick legs off BIG while pushing up with UE to upright sitting position. SBA assist for mobility  -sit to supine - VC to throw down body while kicking legs up then rolling to back. Pt required Hayder assistance with progression to SBA during last 2 reps. Therapist took shoes off to assist with getting feet onto mat  -Sit to stand from EOM x 6 reps - Pt able to verbalize steps s - VC for hand placement, scoot to EOM, placing feet back and increasing anterior weight shift. SBA  Pt able to stand on first attempt approximately 80% of the treatment  -BIG Walking with 4WW 150+60 feet and SBA for safety. VC for upright posture and increased step length. As pt fatigues she demonstrates increased trunk flexion and decreased step length.   -Turns: 360 degree turn with 4WW - VC for increased step height. CCW: 12 steps, x 2 reps CW: 16 steps consistently despite VC. X 3 reps SBA for safety. Pt tends to make a larger [employer] [place] when turning CW. Pt requires intermittent rest breaks d/t fatigue and R leg pain.         Carryover Assignment: Up BIG from w/c  HEP:  Clam Exercises (handout provided), 1A and 1B of BIG exercises (handouts provided);  B Hip Abduction with fidel marquez (also issued Green Tband), Bridging with MARCO serrano SLR, Instructed pt to walk every hour around her home " "(ie dining room table or down hallway of building)  Hip flex with Hip Abduction \"Straddle stepping\"    Assessment/Plan for week:  9/2/2019-9/6/2019:   Patient is progressing towards established goals.  After education, instruction and practice, patient was able to perform bed mobility with SBA. Sit to stand without arm rests continues to be challenging, however patient knows the steps she should be following to set herself up for a successful transfer.  She acknowledges that she needs to do more at home and not let her  automatically help her.  Plan to continue addressing strength, activity tolerance and functional mobility in order to reduce burden of care and improve ease of mobility.     Revised Goals as of 7/2/2019:                 -360 degree turn < /= 12 steps,               -TUG < /= 20 seconds,               -30 Second Chair Stand > /=8 stands with UE support,               -Transitional Mobility:                           Bed mobility with SBA,                            Transfers from chair without arm rest with SBA,              - Gait speed > 0.6m/second, # steps in 6 meters < /=15 steps               report less than 4 falls per month,   - MET   -Patient will ambulate > 200 feet in 6 minute with RPD< 5/10 to indicate improved functional activity tolerance for participating in social events &/or household/community ambulation.     -Patient will be mod I/SBA with HEP  Patient Functional Goal: improved turns and walking in order to reach bathroom easier  Goals were established with the patient: yes      "

## 2021-05-31 NOTE — PROGRESS NOTES
"Physical Therapy  Physical Therapy  Movement Disorder Treatment Note      Date: 8/6/2019   Visit #: 3/8 (12)  Rx Units: TE 2; TA 2  Total OP Minutes: 55  MEDICARE  Certification Dates: from 7/3/2019 to 10/1/2019      Pain:  Pt did not report any specific pain but did complain of R ankle/knee discomfort due to swelling  Location:  R LE .  Intervention: --      Subjective:  Pt reported she really worked hard to walk more yesterday. She stated she walked the hallway 3x but today she feels her R LE has more discomfort this date in her knee and ankle.  Pt reported her  found her old compression stocking and applied them and  felt swelling went down    When asked, pt reported she has not fallen in \"months\"    , Eleazar present for treatment    Objective:  Therapeutic Exercise:  Total Minutes: 25  Sustained Movements (sitting)  4reps  x 1-2 sets       Exercise   Reps   Sets   Effort     1A Floor to Ceiling Without Flicking        1B Side to Side Without Flicking         Comments:   1A:    1B:      Other:   NuStep:  For aerobic and muscular conditioning:  Assisted pt with set up: Assisted R foot onto step and adjusted seat and arms for optimal positioning: Seat set at # 7  Arms # 10  Level: 2     VCs for increased effort and amplitude. \"Keep your SPM > 50\" Tried to keep conversation to a minimum as pt easily gets distracted from task, slowing her speed  Total Minutes:  15 minutes Level #2,  Ave METS: 1.6    SPM: 50 PRE: 5/10      Issued Size \"E\" tubagrip and donned tubagrip to both LE from knee to ankle to encourage increased compression of LEs to assist with swelling. Instructed pt and  to assess if it was successful and if so, PT could issue more tubagrip.    Strengthening for improved gait and postural stability:  Sidelying:   Clam Exercises: 20 reps x 2 sets Dk Pt reported discomfort in R hip when L side lying Min A to position pt in proper alignment as pt rolls posterior.  TCs to increase " "effort and ROM achieved. Minimal ROM achieved d/t significant weakness             Neuro-Reeducation/Balance:  Total Minutes:   Multidirectional Repetitive Movements.  8-16reps  x 1-2 sets     Step and Reach:   UE support:                     Chairs Nearby:        Exercise   Reps   Sets   Effort     2A Forward Step and Reach        2B Sideward Step and Reach        2C Backward Step and Reach         Comments: 2A and 2B adapted to sitting position  One side at a time  2A -   2B-          Gait Training:   Total Minutes:       Therapeutic Activity  Total Minutes: 30     Functional Movement  Rep  Sets  Effort    Rolling to R only       Supine to Sit       Sit to Stand from standard chair       Sit to Stand from EOM 20\"       Sit and Reach       Lateral supine scooting       PWR Moves: seated marching      Supine hip/knee flexion \"marching\"        Comments:     Sit to stand:  VCs for hand placement for one hand on seat and other on 4WW  VCs for feet placement closer to hips  VCs to wt shift forward.  Pt able to complete stand with SBA but required 2-3 attempts on occasion throughout therapy    Bed Mobility:   Sit to Supine VCs and modeling for BIG movement when transferring from sitting to down onto elbow and lift legs up  VCs to move to sidelying vs back.  Min A required to lift LEs onto mat Pt reports she is fearfull of falling limiting her freedom of movement  Pt reported signficant dizziness when reaching supine     Rolling:  PWR UE abd/add to \"prime\" rolling  Had pt hold onto a cane and reach out and tap targets to left and right of her x 10 reps  VCs to maximize reach, then had pt work on rolling R and L using a BIG arm swing to assist with momentum Pt lacked amplitude and power requiring Min a to complete full roll     Supine to Sit:  VCs to swing R arm over to L to increase momentum to help with rolling Then pt required Min A to  help trunk first 10 % of transfer. Pt requires Max cuing throughout to apply " increased effort    BIG Walking with 4WW: x 300 feet SBA instructing pt to take 2 standing rest breaks to minimize fatigue and R LE discomfort.   VCs to improve hip extension and knee extension as with fatigue (obvious last 75 feet)  R genu valgum and knee flexion increases causing more pressure on R ankle. Pt able to consistently achieve step through gait with cuing but continues to maintain a flexed posture.    Four Square Stepping:   Place theraband on floor to create four square:  L<>R side stepping with UE support from 4WW  VCs to BIG steps to achieve stance in each step while clearing feet from floor SBA; Forward <> Backward stepping with UE support from 4WW VCs for increase step clearance with each rep SBA; Diagonal Stepping with UE support  This was more challenging for pt as pt did not trust placing weight on R LE. VCs to BIG step and greater R wt shift. Pt able to complete reps however relied on UE support. SBA provided for safety      Carryover Assignment: Up BIG from w/c  HEP:  Clam Exercises (handout provided), 1A and 1B of BIG exercises (handouts provided);  B Hip Abduction with fidel marquez, Bridging with MARCO serrano SLR, Instructed pt to walk every hour around her home (ie dining room table or down hallway of building)    Assessment/Plan for week:  8/5 - 8/9/2019:  Pt's R LE swelling continues to increase pt's overall discomfort with wt bearing activities.  Pt is also significantly limited by her proximal weakness. Pt did demonstrate progress as she initiated more ambulation at home this week but unfortunately felt this caused more R LE discomfort. Encouraged pt to continue to work on strengthening and walking as this should improve with time and effort. Plan to continue to work on proximal strength in order to improve ease and efficiency of gait and transfers while decreasing pt's falls risk.      Revised Goals as of 7/2/2019:                 -360 degree turn < /= 12 steps,               -TUG <  /= 20 seconds,               -30 Second Chair Stand > /=8 stands with UE support,               -Transitional Mobility:                           Bed mobility with SBA,                            Transfers from chair without arm rest with SBA,              - Gait speed > 0.6m/second, # steps in 6 meters < /=15 steps               report less than 4 falls per month,    -Patient will ambulate > 200 feet in 6 minute with RPD< 5/10 to indicate improved functional activity tolerance for participating in social events &/or household/community ambulation.     -Patient will be mod I/SBA with HEP  Patient Functional Goal: improved turns and walking in order to reach bathroom easier  Goals were established with the patient: yes                                                                                                                                                                                                                                                                                                                                                                                                                                                              Pt appeared frustrated with her pain this date.  Attempted to modify transfers to decrease pain however pain would transition elsewhere. Pt has significant muscular weakness contributing to arthritic pain. Pt also has swelling in R ankle but when asked pt reports she is not really doing anything for the swelling and doesn't know why she has it. Plan to continue to focus on LE strengthening in order to improve muscular strength which then will decrease joint discomfort and increase mobility.  Goals remain appropriate however progress will be slow d/t pt's age and lack of strength and increased pain.    Goals Established at [facility] on: 5/23/2019   360 degree turn < /= 13 steps,               TUG < /= 26 seconds,               30 Second Chair Stand > /=8 stands with UE  support,               Transitional Mobility:                           bed mobility with SBA,                            Transfers from chair without arm rest with Hayder,               Gait speed > 0.48m/second, # steps in 6 meters < /=19 steps                report less than 4 falls per month,    Patient will ambulate > 200 feet in 6 minute with RPD< 5/10 to indicate improved functional activity tolerance for participating in social events &/or   household/community ambulation.     Patient will be mod I/SBA with HEP

## 2021-05-31 NOTE — PROGRESS NOTES
"Physical Therapy  Physical Therapy  Movement Disorder Treatment Note      Date: 8/1/2019   Visit #: 2/8 (11)  Rx Units: TE 2; TA 2  Total OP Minutes: 60  MEDICARE  Certification Dates: from 7/3/2019 to 10/1/2019      Pain:  Pt did not report any specific pain but did complain of ankle discomfort due to swelling  Location:  R LE .  Intervention: --      Subjective:  Pt reported her MD appt with Dr Lane went well. Dr Lane changes her carbidopa/levadopa to assist with better sleep.  She also encouraged and educated pt on increasing her mobility. She also reported that mobility will help with LE swelling    , Eleazar present for treatment    Objective:  Therapeutic Exercise:  Total Minutes: 35  Sustained Movements (sitting)  4reps  x 1-2 sets       Exercise   Reps   Sets   Effort     1A Floor to Ceiling Without Flicking        1B Side to Side Without Flicking         Comments:   1A:    1B:      Other:   NuStep:  For aerobic and muscular conditioning:  Assisted pt with set up: Assisted R foot onto step and adjusted seat and arms for optimal positioning: Seat set at # 8  Arms # 10  At 4 minutes decreased Workload from 3 to 4 as pt reported increased difficulty \"This seems so hard\" Pt reports R ankle discomfort  Level: 3   At 4 minutes decreased Workload from 3 to 2 as pt reported increased difficulty \"This seems so hard\" Pt reports R ankle discomfort  VCs for increased effort and amplitude. This signficant declines with conversation  Total Minutes:  15 minutes Level #3-2,  Ave METS: 1.4    SPM: 40 PRE: 6/10      Strengthening for improved gait and postural stability:  Supine:   Bridges 10 reps x 3 sets Max VCs to engages glutes and LEs for full extension. First set pt unable to clear mat \"I just feel heavy. It's hard today\"  2nd set required Min - Mod A to maintain knees at midline as pt lacks hip strength (knees fall to L). With this A, pt able to clear hips from mat each rep   SLR 10 rep Dk x 2 sets Placed target for pt " "to reach during each rep  VCs and TCs to maintain knee extension however pt has limited knee extension along with weakness limiting TKE    Standing:     Marching in place x 10 reps with UE support from 4WW  VCs and modeling for upright posture and increased hip flexion with each rep   Single Side Step to R than L x 10 reps with UE support from 4WW  VCs to use wheel of walker as target to step to. VCs required to reach target each step and to bring trailing LE next to opp leg to maximize hip strengthening. Pt tended to compensate by flexing leg forward rather than engaging hip abd/add.    Seated:   Manually resisted Hip Abd and Add x 10 reps  VCs to increase effort especially with R LE. Pt unable to complete full range against resistance on R d/t weakness.               Neuro-Reeducation/Balance:  Total Minutes:   Multidirectional Repetitive Movements.  8-16reps  x 1-2 sets     Step and Reach:   UE support:                     Chairs Nearby:        Exercise   Reps   Sets   Effort     2A Forward Step and Reach        2B Sideward Step and Reach        2C Backward Step and Reach         Comments: 2A and 2B adapted to sitting position  One side at a time  2A -   2B-          Gait Training:   Total Minutes:       Therapeutic Activity  Total Minutes: 25     Functional Movement  Rep  Sets  Effort    Rolling to R only       Supine to Sit       Sit to Stand from standard chair       Sit to Stand from EOM 20\"       Sit and Reach       Lateral supine scooting       PWR Moves: seated marching      Supine hip/knee flexion \"marching\"        Comments:     Marching in place to prime for sit to stand  VCs and modeling for \"high knees\"     Sit to stand:  VCs for hand placement One on seat and other on 4WW  VCs for feet placement closer to hips  VCs to wt shift forward. PT placed 4WW more out in front of pt to encourage forward wt shift.  Pt able to complete stand with SBA but required 2-3 attempts each rep    Bed Mobility:   Sit to " "Supine VCs and modeling for BIG movement when transferring from sitting to down onto elbow and lift legs up Pt unable to complete transfer with Min A Pt reports she is fearfull of falling limiting her freedom of movement     Scooting on Mat: Big Marching to R then Left with bridge x 5 reps Dk Pt had greater difficulty and needed more time to complete bridge when moving to R  VCs for increased effort. When pt applied greater effort pt was able to complete rep      Supine to Sit:  VCs to swing R arm over to L to increase momentum to help with rolling Pt completed 3 reps to gain momentum but still required Min A to complete rolling.  Then pt required Min A to  help trunk first 25 % of transfer. Pt requires Max cuing throughout to apply increased effort    BIG Walking with 4WW: x 300 feet taking 1 standing rest break TCs and VCs to improve hip extension and trunk extension  Pt able to consistently achieve step through gait with cuing but continues to maintain a flexed posture.  Increased shuffling noted after 150 feet. Last 75 feet pt had significant genu valgum R>L \"th left knee is holding up the right knee\" Pt can obtain a better upright posture and step through gait.      Carryover Assignment: Up BIG from w/c  HEP:  Clam Exercises (handout provided), 1A and 1B of BIG exercises (handouts provided);  B Hip Abduction with fidel marquez, Bridging with MARCO serrano SLR, Instructed pt to walk every hour around her home (ie dining room table or down hallway of building)    Assessment/Plan for week:  7/29 - 8/2/2019:  Pt returns to PT following MD appt with Dr Lane.  Pt received a change in her Sinemet and was encouraged to increase her mobility in order to help with LE swelling and overall well being and efficiency of walking and transfers.  This PT continued to educate pt on importance of mobility reiterating what MD said.  Pt's  was support and pt verbalized understanding.  Pt demonstrates significant proximal " weakness limiting ease of sit to stand transfers and gait.  Pt is bradykinetic and requires constant cuing to increase effort. Once effort is applied pt performance increases. Pt would benefit from continued PT to improve muscular and aerobic strength so gait is less fatiguing and transfers can become more consistently independent.      Revised Goals as of 7/2/2019:                 -360 degree turn < /= 12 steps,               -TUG < /= 20 seconds,               -30 Second Chair Stand > /=8 stands with UE support,               -Transitional Mobility:                           Bed mobility with SBA,                            Transfers from chair without arm rest with SBA,              - Gait speed > 0.6m/second, # steps in 6 meters < /=15 steps               report less than 4 falls per month,    -Patient will ambulate > 200 feet in 6 minute with RPD< 5/10 to indicate improved functional activity tolerance for participating in social events &/or household/community ambulation.     -Patient will be mod I/SBA with HEP  Patient Functional Goal: improved turns and walking in order to reach bathroom easier  Goals were established with the patient: yes                                                                                                                                                                                                                                                                                                                                                                                                                                                              Pt appeared frustrated with her pain this date.  Attempted to modify transfers to decrease pain however pain would transition elsewhere. Pt has significant muscular weakness contributing to arthritic pain. Pt also has swelling in R ankle but when asked pt reports she is not really doing anything for the swelling and doesn't know why she  has it. Plan to continue to focus on LE strengthening in order to improve muscular strength which then will decrease joint discomfort and increase mobility.  Goals remain appropriate however progress will be slow d/t pt's age and lack of strength and increased pain.    Goals Established at [facility] on: 5/23/2019   360 degree turn < /= 13 steps,               TUG < /= 26 seconds,               30 Second Chair Stand > /=8 stands with UE support,               Transitional Mobility:                           bed mobility with SBA,                            Transfers from chair without arm rest with Hayder,               Gait speed > 0.48m/second, # steps in 6 meters < /=19 steps                report less than 4 falls per month,    Patient will ambulate > 200 feet in 6 minute with RPD< 5/10 to indicate improved functional activity tolerance for participating in social events &/or   household/community ambulation.     Patient will be mod I/SBA with HEP

## 2021-05-31 NOTE — PROGRESS NOTES
"Physical Therapy  Physical Therapy  Movement Disorder Treatment Note      Date: 8/13/2019   Visit #: 4/8 (13)  Rx Units: TE 2; TA 2  Total OP Minutes: 60  MEDICARE  Certification Dates: from 7/3/2019 to 10/1/2019      Pain:  Pt did not report any specific pain but did complain of R ankle/knee discomfort due to swelling during ambulation  Location:  R LE .  Intervention: --      Subjective:  Pt reports she hasn't fallen and pain hasn't increased. However the sharp pain in her R knee makes her want to stop walking \"I feel like my knee is going to give out\" \"Bone on bone!\" \"It feels like something is coming out of my shin!\"  Pt reports she has been wearing the tubagrip and her swelling in her legs is less. It appears that her R ankle isn't as bulbous.  Issued pt another pair of compression \"E\" tubagrip for home use    , Eleazar present for treatment    Objective:  Therapeutic Exercise:  Total Minutes: 35  Sustained Movements (sitting)  4reps  x 1-2 sets       Exercise   Reps   Sets   Effort     1A Floor to Ceiling Without Flicking        1B Side to Side Without Flicking         Comments:   1A:    1B:      Other:   NuStep:  For aerobic and muscular conditioning:  Assisted pt with set up: Assisted R foot onto step and adjusted seat and arms for optimal positioning: Seat set at # 7  Arms # 10  Level: 3     VCs for increased effort and amplitude. \"Keep your SPM > 50\" Tried to keep conversation to a minimum as pt easily gets distracted from task, slowing her speed Pt demonstrated greater initiative this date to independently increase her SPM throughout \"Come on! I need to get going!\"  Total Minutes:  15 minutes Level #3,  Ave METS: 1.6    SPM: 51 PRE: 10/10      Strengthening for improved gait and postural stability:  Seated:   Hip Abduction with Green Tband 12 reps x 3 sets  TCs for greater effort with R LE due to increased weakness.  With effort pt was able to achieve greater AROM   Scapula retraction with Green tband " "10 reps x 3 sets  Max VCs and modeling with each rep to isolate scap retraction and for improved upright posture  Good carry through with cuing 50% of reps   Marching x 10 reps Dk  VCs for bigger amplitude  Pt able to clear floor with each rep Good effort observed   Hip flexion with Hip Abdcution \"Lift up and out!\" x 10 reps Dk  \"Pretend you are getting on and off a horse!\"  Modeling for sequencing.  Observed obvious R genu valgum with Hip Flex/Adb    Core Strengtheing for postural stabilty   Seated resisted cane perturbations \"Don't let me push you over\" Holding 10 sec x 5 reps  VCs to keep elbow tight to body to maximize core engagement         Neuro-Reeducation/Balance:  Total Minutes:   Multidirectional Repetitive Movements.  8-16reps  x 1-2 sets     Step and Reach:   UE support:                     Chairs Nearby:        Exercise   Reps   Sets   Effort     2A Forward Step and Reach        2B Sideward Step and Reach        2C Backward Step and Reach         Comments: 2A and 2B adapted to sitting position  One side at a time  2A -   2B-          Gait Training:   Total Minutes:       Therapeutic Activity  Total Minutes: 25     Functional Movement  Rep  Sets  Effort    Rolling to R only       Supine to Sit       Sit to Stand from standard chair       Sit to Stand from EOM 20\"       Sit and Reach       Lateral supine scooting       PWR Moves: seated marching      Supine hip/knee flexion \"marching\"        Comments:     Sit to stand:  VCs for hand placement for one hand on seat and other on 4WW  VCs for feet placement closer to hips  VCs to wt shift forward.  Pt able to complete stand with SBA but required 2-3 attempts on occasion throughout therapy    BIG Walking with 4WW 100 feet x 4 with seated rest  SBA  VCs for improved trunk extension and forward gaze as pt would look down. Able to self correct with cuing but would resort to flexed posture when cues were not present.  Pt did not complain of R knee discomfort " "until 75 feet    Pivot Turns x 4 reps with 4WW to standard chair  VCs and modeling provided to  her feet when pivoting as pt tends to wt shift with her hips and not move her feet. Encouraged BIG marching steps to complete every pivot. Pt verbalized understanding, then demonstrated correct form. PT educated and encouraged pt's  to remind pt to  her feet with each turn as good feet clearance reduces falls risk.      Carryover Assignment: Up BIG from w/c  HEP:  Clam Exercises (handout provided), 1A and 1B of BIG exercises (handouts provided);  B Hip Abduction with burgundy tband (also issued Green Tband), Bridging with MARCO serrano SLR, Instructed pt to walk every hour around her home (ie dining room table or down hallway of building)  HIp flex with Hip Abduction \"Straddle stepping\"    Assessment/Plan for week:  8/12 -8/16/2019:  Pt appears brighter and demonstrates more effort and initiative in regards to moving BIG and completing each intervention. Pt is pleased with seeing improvement with her LE swelling with use of tubagrip however R LE pain/discomfort limits her tolerance to wt bearing activities especially walking.  Therefore, focus has been on proximal and  functional strengthening  to improve ease and efficiency of transfers and ambulation.  This date educated pt on improved technique with turns in order to to decrease pt's falls risk.  Will re evaluate turns next visit to assess carry over.  Current POC and goals remain appropriate at this time.  Checked with [employer] from [employer] regarding status of pt's lightweight manual w/c. ATP replied they are waiting for MD's signature, but w/c is on order.    Revised Goals as of 7/2/2019:                 -360 degree turn < /= 12 steps,               -TUG < /= 20 seconds,               -30 Second Chair Stand > /=8 stands with UE support,               -Transitional Mobility:                           Bed mobility with SBA,         "                    Transfers from chair without arm rest with SBA,              - Gait speed > 0.6m/second, # steps in 6 meters < /=15 steps               report less than 4 falls per month,    -Patient will ambulate > 200 feet in 6 minute with RPD< 5/10 to indicate improved functional activity tolerance for participating in social events &/or household/community ambulation.     -Patient will be mod I/SBA with HEP  Patient Functional Goal: improved turns and walking in order to reach bathroom easier  Goals were established with the patient: yes                                                                                                                                                                                                                                                                                                                                                                                                                                                              Pt appeared frustrated with her pain this date.  Attempted to modify transfers to decrease pain however pain would transition elsewhere. Pt has significant muscular weakness contributing to arthritic pain. Pt also has swelling in R ankle but when asked pt reports she is not really doing anything for the swelling and doesn't know why she has it. Plan to continue to focus on LE strengthening in order to improve muscular strength which then will decrease joint discomfort and increase mobility.  Goals remain appropriate however progress will be slow d/t pt's age and lack of strength and increased pain.    Goals Established at [facility] on: 5/23/2019   360 degree turn < /= 13 steps,               TUG < /= 26 seconds,               30 Second Chair Stand > /=8 stands with UE support,               Transitional Mobility:                           bed mobility with SBA,                            Transfers from chair without arm rest with Hayder,                Gait speed > 0.48m/second, # steps in 6 meters < /=19 steps                report less than 4 falls per month,    Patient will ambulate > 200 feet in 6 minute with RPD< 5/10 to indicate improved functional activity tolerance for participating in social events &/or   household/community ambulation.     Patient will be mod I/SBA with HEP

## 2021-05-31 NOTE — PROGRESS NOTES
"Physical Therapy  Physical Therapy  Movement Disorder Treatment Note      Date: 8/27/2019   Visit #: 6/8 (15)  Rx Units: TE 3; TA 1.  Total OP Minutes: 60  MEDICARE  Certification Dates: from 7/3/2019 to 10/1/2019    Pain: NR - \"It's good!\"  Location:  R LE  Intervention: NA    Subjective:  Patient reports feeling better at moving around her home.  reports \"she's doing a lot more for herself.\" The swelling is down in her legs and her R LE feels much better in terms of pain.   , Eleazar present for treatment.    Objective:  Therapeutic Exercise:  Total Minutes: 40  Sustained Movements (sitting)  4reps  x 1-2 sets       Exercise   Reps   Sets   Effort     1A Floor to Ceiling Without Flicking        1B Side to Side Without Flicking         Comments:   1A:    1B:      Other:   NuStep:  For aerobic and muscular conditioning:  Assisted pt with set up: Assisted R foot onto step and adjusted seat and arms for optimal positioning: Seat set at # 7  Arms # 10. VC for increased effort and power with movements.  Cues for SPM >50.  10:30  30 Second SPRINT: Max SPM achieved : 70 Spm  12:00 30 Second SPRINT: Max SPM achieved: 72  13:30 30 Second SPRINT: Max SPM achieved: 75  *Max encouragement/VCs provided for increased effort and amplitude throughout 30 sec sprint  Total Minutes:  15 minutes Level #4,  Ave METS: 1.8    SPM: 52 PRE: 8/10      Strengthening for improved gait stability and functional mobility:  Seated at edge of chair to engage core:  VCs for upright posture throughout   Marching with 5# cuff weights 2 x 10 reps B  - VC and modeling for BIG effort and power. VC to keep chin up and sit tall. Pt able to clear foot consistently.    Hip flexion with Hip Abduction \"Lift up and out!\" x 10 reps with 5# cuff weights [place] Progressed to stepping over cane on ground 3 sets  x 8 reps B. Modeling for sequencing.  Decreased ROM on R side with max cues for foot clearance during last 3 reps.      Standing using 4WW and " "SBA:   Hip flexion with Hip Abduction (no weight) stepping/tapping over cane on ground x 8 B.   Good foot clearance observed    Neuro-Reeducation/Balance:  Total Minutes: Combined with TA     Step and Reach:   UE support:                     Chairs Nearby:        Exercise   Reps   Sets   Effort     2A Forward Step and Reach        2B Sideward Step and Reach        2C Backward Step and Reach         Comments: 2A and 2B adapted to sitting position  One side at a time  2A -   2B-     Standing and Reaching \"high fives to therapist\" with single UE support multiple attempts and then no UE support \"double high-fives\" mutliple attempts using SBA for safety. No LOB. WIth reaching pt able to achieve mildly improved upright posture/trunk extension    Gait Training:   Total Minutes:     Therapeutic Activity  Total Minutes: 15     Functional Movement  Rep  Sets  Effort    Rolling to R only       Supine to Sit       Sit to Stand from standard chair       Sit to Stand from EOM 20\"       Sit and Reach       Lateral supine scooting       PWR Moves: seated marching      Supine hip/knee flexion \"marching\"        Comments:   In order to improve independence with functional mobility:    Sit to stand throughout therapy session - VC for hand placement, scoot to EOM, placing feet back and increasing anterior weight shift. SBA  Pt able to stand on first attempt approximately 80% of the treatment    BIG Walking with 4WW 200 feet x 2 using SBA for safety. VCs to increase step length (especialy R), maintain tall posture, and maintain speed around turn-arounds. Last 50 feet of 2 bout of ambulation pt complained of fatigue. VCs to slow down gait however continue with step through gait.  Pt's posture decreased with fatigue however pt was able to complete the entire bout of ambulation.    Pivot Turns -  Obstacle Course to encourage turns CW and CC. VCs to maintain speed around turns with good follow through from patient. SBA for safety   Through 5 " "cones. VCs to maintain speed around turns with good follow through from patient.   Through 3 cones with chairs on either end. VCs to maintain speed around turns and to side step upon turn around for correct placement at chair to safely sit.     Carryover Assignment: Up BIG from w/c  HEP:  Clam Exercises (handout provided), 1A and 1B of BIG exercises (handouts provided);  B Hip Abduction with burag tband (also issued Green Tband), Bridging with MARCO serrano SLR, Instructed pt to walk every hour around her home (ie dining room table or down hallway of building)  Hip flex with Hip Abduction \"Straddle stepping\"    Assessment/Plan for week:  8/26 -8/30/2019:   Increased pt's resistance on NuStep this date and added bout of sprinting to achieve increased effort and amplitude.  Patient tolerated increased resistance and sprints (periods of increased RPM) with proportional fatigue but no increased report of dyspnea demonstrating pt's ability to perform this activity at a higher level but needs continual encouragement to sustain level of effort.  Pt reported little to no LE discomfort which is a significant improvement since initial sessions of PT.  This may be contributed to pt's decrease in swelling of LEs.  Therefore will continue to encourage continued use of compression stockings.  Pt continues tor require cues during transfers to improve ease and stability however once cues applied pt able to self correct 100% of the time. Pt's overall functional mobility remains slow and lacks amplitude, and pt fatigues easily however this has improved since PT evaluation. Pt has 2 visits left on current POC. Discussed pt's options of discharging or reassessing and continuing with therapy. Pt unsure what she would like to do therefore instructed pt to return to next PT appt with a plan and goals for continued therapy if applicable.    SPT under direct supervision of PT with PT directing treatment and plan of care.  Leanna Carbone, " SPT  Ana Alicea, PT      Revised Goals as of 7/2/2019:                 -360 degree turn < /= 12 steps,               -TUG < /= 20 seconds,               -30 Second Chair Stand > /=8 stands with UE support,               -Transitional Mobility:                           Bed mobility with SBA,                            Transfers from chair without arm rest with SBA,              - Gait speed > 0.6m/second, # steps in 6 meters < /=15 steps               report less than 4 falls per month,    -Patient will ambulate > 200 feet in 6 minute with RPD< 5/10 to indicate improved functional activity tolerance for participating in social events &/or household/community ambulation.     -Patient will be mod I/SBA with HEP  Patient Functional Goal: improved turns and walking in order to reach bathroom easier  Goals were established with the patient: yes

## 2021-06-01 NOTE — PROGRESS NOTES
Physical Therapy  Physical Therapy  Neurological: Discharge Summary     Medical Diagnosis: Parkinson's Disease                     Treatment Diagnosis: bradykinesia, unstable balance, gait instability/abnormality, LE weakness and decreased functional mobility                          Referring MD: Dr. Deana Lane  Onset Date: 4/5/2019  Start of Care: 5/23/2019     Date: 9/10/2019   Visit #: 8/8 (17)  Rx Units: 1- TE, 3- TA   Total OP Minutes: 55  MEDICARE  Certification Dates: from 7/3/2019 to 10/1/2019    Pain: 6/10  Location:  R LE  Intervention: rest prn    Subjective:  When prompted for her review of PT, the pt reports that PT has helped her get up from a chair a lot quicker, increased ease of getting into bed (reporting having done it by herself the last 3 nights in a row), and decreasing her fear of falling (although it is still present). Her  reports helping the pt less so that pt can attempt activities herself, which has been going well. Pt reported being most limited by pain in R hip down to R foot, which especially prevents her from walking. Reports her manual w/c is being delivered tomorrow.    Eleazar present for treatment.    Objective:  Therapeutic Exercise:  Total Minutes: 12  Sustained Movements (sitting)  4reps  x 1-2 sets       Exercise   Reps   Sets   Effort     1A Floor to Ceiling Without Flicking        1B Side to Side Without Flicking         Other:   NuStep:  For aerobic and muscular conditioning:  Assisted pt with set up: adjusted seat and arms for optimal positioning and position R foot on pedal: Seat set at # 7  Arms # 10. VC for increased effort and power with each movement.  Cues for SPM >60.  Total Minutes:  10 minutes Level #4,  Ave METS: 1.9    SPM: 58 PRD: 3/10      Neuro-Reeducation/Balance:  Total Minutes:      Step and Reach:   UE support:                     Chairs Nearby:        Exercise   Reps   Sets   Effort     2A Forward Step and Reach        2B Sideward Step and Reach  "       2C Backward Step and Reach         Comments: 2A and 2B adapted to sitting position  One side at a time  2A -   2B-     Gait Training:   Total Minutes:     Therapeutic Activity  Total Minutes:      Functional Movement  Rep  Sets  Effort    Rolling to R only 2 1     Supine to/from Sit 2 1     Sit to Stand from standard chair 3 - unable to do without UE 1     Sit to Stand from EOM 18\"       Sit and Reach       Lateral supine scooting 3 1     PWR Moves: seated marching      Supine hip/knee flexion \"marching\"        Comments: Pt displayed a decreased effort in all bed mobility. Pt required VCs for improving form and technique but completed movements independently. Pt attempted sit to stands without UE support and was not able to give best efforts as she felt uncomfortable continuing without UE. \"I can't do this.\" Minimal effort required for sit to stands using UE throughout treatment session x 8    - see assessment and plan chart below for additional activities engaged in during session    Carryover Assignment: Up BIG from w/c, BIG walking, rolling technique for increased efficiency of bed mobility, propelling self using LE in w/c around the house, standing out of w/c at least once per hour.     HEP:  Clam Exercises (handout provided), 1A and 1B of BIG exercises (handouts provided);  B Hip Abduction with fidel tband (also issued Green Tband), Bridging with MARCO serrano SLR, Instructed pt to walk every hour around her home (ie dining room table or down hallway of building)  Hip flex with Hip Abduction \"Straddle stepping\"    Assessment/Plan:    Transitional Movements  Initial  (5/23/2019) ReAssessment  (7/2/2019)    Post Treatment  (9/10/2019) Goal: Met?   TUG(Timed Up and Go) (sec) 34.11 sec  With 4WW 22.83 sec  With 4WW 25.25 sec with 4WW < /= 20 seconds - PROGRESSING   360 Turn 16 steps with 4WW 12 steps with 4WW To L: 11 steps with 4WW   To R: 13 steps with 4WW < /= 12 steps - MET   Bed Mobility NT Min to " Mod A for R LE Assistance for set up and sequencing but movements done SBA. SBA - MET   Transfers (stand pivot) Mod A from armless chair Min A in 3 attempts SBA with UE without arm rest with SBA - PARTIALLY   Strength  Initial   Post Treatment Goal: Met?   30sec Chair Stand (#) 6 stands  With UE support 7.5 stands   With UE support  NT NT due to fatigue of reassessments    Gait Initial  Post Treatment Goal: Met?    Gait Speed in 6m (sec) 0.31 m/sec  20 steps  With 4WW 0.52 m/sec   16 steps  With 4WW 0.74 m/s  13 steps with 4WW > 0.6m/second, # steps in 6 meters < /=15 steps - MET   Activity Tolerance Initial   Post Treatment Goal: Met?    6 min walk test (ft) 100 feet with 4WW  feet with 4WW > 200 feet - MET     Functional Outcome Changes: see chart above    Evaluation: Goals Met? Partially     -360 degree turn < /= 12 steps, PARTIALLY (MET in one direction but not the other)              -TUG < /= 20 seconds, PROGRESSING  Pt continues to demonstrate significant hip weakness as seen with R genu valgum contributing to slower gait and turns              -30 Second Chair Stand > /=8 stands with UE support, NT as of today's date but PROGRESSING from previous testing.              -Transitional Mobility:                           Bed mobility with SBA,  MET                          Transfers from chair without arm rest with SBA, PARTIALLY  Pt continues to lack LE strength in order to stand without UE support. Pt has HEP to continue to improve this.             - Gait speed > 0.6m/second, # steps in 6 meters < /=15 steps - MET   -Patient will ambulate > 200 feet in 6 minute with RPD< 5/10 to indicate improved functional activity tolerance for participating in social events &/or household/community ambulation, MET  Patient's Functional Goal:  report less than 4 falls per month, NT    Patient seen from 5/23/2019 to 9/10/2019 for 17 treatment sessions.    Recommendation: D/C with HEP.  to assist in set up and  cueing of HEP to prevent decline in progress. Pt to use Up BIG from w/c and BIG walking for functional mobility around the house and in the community. Pt to use rolling technique for increased efficiency and independence in bed mobility. Pt to propell self using LE in w/c around the house and stand out of w/c at least once per hour to maintain aerobic activity tolerance and increase functional mobility and independence.     Physician Recommendation:  1. I certify the need for these services furnished within this plan and while under my care. I agree with the therapist's recommendation for plan of care.    2. If there is any recommendation for modification of therapy plan, please indicate below.    Physician's Signature (Printed):  _____________________________    SPT under direct supervision of PT with PT directing treatment and plan of care.  Amanda Carbone, PT Student   Ana Alicea, PT

## 2021-06-05 NOTE — PROGRESS NOTES
Summation of Care    Patient came for an OT evaluation on 5/23/19.  Patient did not return for any of the 8 prescribed visits. Patients OT plan of care for this episode of care will be discontinued at this time.     SARWAT Luong, OTR/L on 1/13/2020

## 2021-06-25 ENCOUNTER — RECORDS - HEALTHEAST (OUTPATIENT)
Dept: LAB | Facility: CLINIC | Age: 86
End: 2021-06-25

## 2021-07-01 ENCOUNTER — RECORDS - HEALTHEAST (OUTPATIENT)
Dept: LAB | Facility: CLINIC | Age: 86
End: 2021-07-01

## 2021-07-03 NOTE — ADDENDUM NOTE
Addendum Note by Ana Alicea PT at 8/27/2019 11:59 PM     Author: Ana Alicea PT Service: -- Author Type: Physical Therapist    Filed: 8/29/2019 11:38 AM Date of Service: 8/27/2019 11:59 PM Status: Signed    : Ana Alicea PT (Physical Therapist)    Encounter addended by: Ana Alicea PT on: 8/29/2019 11:38 AM      Actions taken: Charge Capture section accepted

## 2021-07-03 NOTE — ADDENDUM NOTE
Addendum Note by Nic Ponce PT at 8/21/2019  4:26 PM     Author: Nic Ponce PT Service: -- Author Type: Physical Therapist    Filed: 8/21/2019  4:26 PM Date of Service: 8/21/2019  4:26 PM Status: Signed    : Nic Ponce PT (Physical Therapist)    Encounter addended by: Nic Ponce PT on: 8/21/2019  4:26 PM      Actions taken: Charge Capture section accepted

## 2021-07-10 ENCOUNTER — RECORDS - HEALTHEAST (OUTPATIENT)
Dept: LAB | Facility: CLINIC | Age: 86
End: 2021-07-10

## 2021-08-02 ENCOUNTER — LAB REQUISITION (OUTPATIENT)
Dept: LAB | Facility: CLINIC | Age: 86
End: 2021-08-02
Payer: COMMERCIAL

## 2021-08-02 DIAGNOSIS — E87.6 HYPOKALEMIA: ICD-10-CM

## 2021-08-03 LAB — POTASSIUM BLD-SCNC: 3.2 MMOL/L (ref 3.5–5)

## 2021-08-03 PROCEDURE — 36415 COLL VENOUS BLD VENIPUNCTURE: CPT | Mod: ORL | Performed by: FAMILY MEDICINE

## 2021-08-03 PROCEDURE — P9604 ONE-WAY ALLOW PRORATED TRIP: HCPCS | Mod: ORL | Performed by: FAMILY MEDICINE

## 2021-08-03 PROCEDURE — 84132 ASSAY OF SERUM POTASSIUM: CPT | Mod: ORL | Performed by: FAMILY MEDICINE

## 2021-08-06 ENCOUNTER — LAB REQUISITION (OUTPATIENT)
Dept: LAB | Facility: CLINIC | Age: 86
End: 2021-08-06
Payer: COMMERCIAL

## 2021-08-06 DIAGNOSIS — Z03.818 ENCOUNTER FOR OBSERVATION FOR SUSPECTED EXPOSURE TO OTHER BIOLOGICAL AGENTS RULED OUT: ICD-10-CM

## 2021-08-07 PROCEDURE — U0003 INFECTIOUS AGENT DETECTION BY NUCLEIC ACID (DNA OR RNA); SEVERE ACUTE RESPIRATORY SYNDROME CORONAVIRUS 2 (SARS-COV-2) (CORONAVIRUS DISEASE [COVID-19]), AMPLIFIED PROBE TECHNIQUE, MAKING USE OF HIGH THROUGHPUT TECHNOLOGIES AS DESCRIBED BY CMS-2020-01-R: HCPCS | Mod: ORL | Performed by: PHYSICIAN ASSISTANT

## 2021-08-10 LAB — SARS-COV-2 RNA RESP QL NAA+PROBE: NEGATIVE

## 2021-09-09 ENCOUNTER — LAB REQUISITION (OUTPATIENT)
Dept: LAB | Facility: CLINIC | Age: 86
End: 2021-09-09
Payer: COMMERCIAL

## 2021-09-09 DIAGNOSIS — E87.6 HYPOKALEMIA: ICD-10-CM

## 2021-09-10 LAB — POTASSIUM BLD-SCNC: 3.6 MMOL/L (ref 3.5–5)

## 2021-09-10 PROCEDURE — 36415 COLL VENOUS BLD VENIPUNCTURE: CPT | Mod: ORL | Performed by: FAMILY MEDICINE

## 2021-09-10 PROCEDURE — 84132 ASSAY OF SERUM POTASSIUM: CPT | Mod: ORL | Performed by: FAMILY MEDICINE

## 2021-09-10 PROCEDURE — P9603 ONE-WAY ALLOW PRORATED MILES: HCPCS | Mod: ORL | Performed by: FAMILY MEDICINE

## 2021-09-20 NOTE — PROGRESS NOTES
[facility]    Facility:  [facility] ([facility]) [[id number]]  Code Status: DNR      CHIEF COMPLAINT/REASON FOR VISIT:  Chief Complaint   Patient presents with     Hospital F/U       HPI:   Diane is a 89 year old female who was recently admitted to the hospital on 9/13/2021.  She has a history of Parkinson's disease and on 913 she was admitted with urosepsis and necrotizing pancreatitis.  Lactate was 8.0 which responded well to fluid resuscitation with 4 L normal saline in the ED.  CT the abdomen and pelvis did find cholelithiasis but no signs of cholecystitis.  She did have necrotizing pancreatitis and also Klebsiella UTI and went septic shock.  She completed 7-day course for pansensitive Klebsiella.  Unclear etiology why she had a necrotizing pancreatitis and she have acute kidney injury with kidney function did normalize with IV fluids.  Family after treatment family indicated in the hospital the patient was close to the baseline.  She did have volume overload at this time and improved with gentle diuresis.  She is is requiring intermittent O2 when sleeping.  Nocturnal hypoxia.  She is continuing O2 at night.  She is DNR/DNI if she is treated appropriate transferred here to the TCU at [facility] in stable condition.    Patient seen in her chair comfortably.  She seems very frail but does not appear to be in acute distress.  She claims she is feeling much better since her hospitalization has no pain issues.  She is urinating without difficulty and there is been no fevers chills nausea vomiting.  There may be some cognitive issues here but unknown secondary to she was just hospitalized.    Past Medical History:  Past Medical History:   Diagnosis Date     Parkinson disease (H)            Surgical History:  Past Surgical History:   Procedure Laterality Date     CATARACT EXTRACTION Right      HYSTERECTOMY       JOINT REPLACEMENT Left 07/12/2016    TRINY     PARTIAL HIP ARTHROPLASTY Right  10/12/2016    Procedure: HIP FRACTURE BIPOLAR / UNIPOLAR;  Surgeon: Daryn Anand MD;  Location: [facility] Main OR;  Service:      REPAIR BLADDER         Family History:   Family History   Problem Relation Age of Onset     Cancer Mother      Cancer Father      Lung Cancer Mother      Prostate Cancer Father      Breast Cancer Sister      Lung Cancer Brother      Melanoma Son      Lung Cancer Brother      Lung Cancer Brother      Lung Cancer Sister        Social History:    Social History     Socioeconomic History     Marital status:      Spouse name: Not on file     Number of children: Not on file     Years of education: Not on file     Highest education level: Not on file   Occupational History     Not on file   Tobacco Use     Smoking status: Never Smoker     Smokeless tobacco: Never Used   Substance and Sexual Activity     Alcohol use: Never     Drug use: Not on file     Sexual activity: Not on file   Other Topics Concern     Parent/sibling w/ CABG, MI or angioplasty before 65F 55M? Not Asked   Social History Narrative     Not on file     Social Determinants of Health     Financial Resource Strain:      Difficulty of Paying Living Expenses:    Food Insecurity:      Worried About Running Out of Food in the Last Year:      Ran Out of Food in the Last Year:    Transportation Needs:      Lack of Transportation (Medical):      Lack of Transportation (Non-Medical):    Physical Activity:      Days of Exercise per Week:      Minutes of Exercise per Session:    Stress:      Feeling of Stress :    Social Connections:      Frequency of Communication with Friends and Family:      Frequency of Social Gatherings with Friends and Family:      Attends [employer] Services:      Active Member of Clubs or Organizations:      Attends Club or Organization Meetings:      Marital Status:    Intimate Partner Violence:      Fear of Current or Ex-Partner:      Emotionally Abused:      Physically Abused:      Sexually Abused:         REVIEW OF SYSTEM: Patient denies any pain.  She denies any fevers chills nausea vomiting diarrhea change in vision hearing taste or smell weakness one-sided of the chest pain or shortness of breath.  She denies any current shortness stool polyphagia polydipsia polyuria depression or anxiety and the remainder the review of systems is negative.      PHYSICAL EXAM: Patient is alert pleasant sitting in her chair comfortably does not appear to be in acute distress.  Head was normocephalic and atraumatic sclera conjunctiva sclera gross is moist nose incision heart sounds are regular.  Lungs show diminished inspiratory volume but no crackles or rales were heard at this time.  Abdomen was obese nontender.  Dull to percussion.  Bowel sounds are positive in all 4 quadrants.  Extremities showed 1-2+ pitting edema bilateral.  Patient was moving all 4 extremities on issue neurologic exam was baseline and nonfocal and affect was pleasant.    Vital; blood pressure 130/68    Pulse of 72    Temperature is 98    Respirations 18    O2 sats 93%.        LABS:       ASSESSMENT:    Encounter Diagnoses   Name Primary?     Necrotizing pancreatitis Yes     Acute kidney injury (H)      Septic shock (H)      Hypervolemia, unspecified hypervolemia type      Metabolic encephalopathy         PLAN: At this time amylase and lipase to be done Thursday a.m. and basic metabolic profile be done Thursday a.m.  Also LFTs to be done Thursday secondary to cholelithiasis and we will get labs from the hospital and download him into the point-and-click care for my review.    Weights to be done daily there was a mention of ascites and there and her abdomen was dull to percussion.  Weights to be done daily I will be notified of any 2 pound weight change in abdominal girth will be measured Monday Wednesday Friday secondary to ascites.    She will undergo physical occupational therapy as ordered and no other changes to care plan at this time.  Care plan was  reviewed and is appropriate.        Electronically signed by: DEBORA COTTRELL DO

## 2021-09-21 ENCOUNTER — TRANSITIONAL CARE UNIT VISIT (OUTPATIENT)
Dept: GERIATRICS | Facility: CLINIC | Age: 86
End: 2021-09-21
Payer: COMMERCIAL

## 2021-09-21 ENCOUNTER — LAB REQUISITION (OUTPATIENT)
Dept: LAB | Facility: CLINIC | Age: 86
End: 2021-09-21
Payer: COMMERCIAL

## 2021-09-21 VITALS
RESPIRATION RATE: 16 BRPM | OXYGEN SATURATION: 93 % | BODY MASS INDEX: 33.29 KG/M2 | DIASTOLIC BLOOD PRESSURE: 66 MMHG | HEIGHT: 67 IN | TEMPERATURE: 97.9 F | SYSTOLIC BLOOD PRESSURE: 140 MMHG | HEART RATE: 76 BPM | WEIGHT: 212.1 LBS

## 2021-09-21 DIAGNOSIS — A41.9 SEPTIC SHOCK (H): ICD-10-CM

## 2021-09-21 DIAGNOSIS — Z22.7 LATENT TUBERCULOSIS: ICD-10-CM

## 2021-09-21 DIAGNOSIS — K85.91 NECROTIZING PANCREATITIS: Primary | ICD-10-CM

## 2021-09-21 DIAGNOSIS — G93.41 METABOLIC ENCEPHALOPATHY: ICD-10-CM

## 2021-09-21 DIAGNOSIS — R65.21 SEPTIC SHOCK (H): ICD-10-CM

## 2021-09-21 DIAGNOSIS — E87.70 HYPERVOLEMIA, UNSPECIFIED HYPERVOLEMIA TYPE: ICD-10-CM

## 2021-09-21 DIAGNOSIS — N17.9 ACUTE KIDNEY INJURY (H): ICD-10-CM

## 2021-09-21 PROCEDURE — 99305 1ST NF CARE MODERATE MDM 35: CPT | Performed by: FAMILY MEDICINE

## 2021-09-21 RX ORDER — ACETAMINOPHEN 500 MG
1000 TABLET ORAL DAILY
COMMUNITY

## 2021-09-21 RX ORDER — ACETAMINOPHEN 500 MG
1000 TABLET ORAL 2 TIMES DAILY PRN
COMMUNITY

## 2021-09-21 RX ORDER — LOPERAMIDE HCL 2 MG
2 CAPSULE ORAL 4 TIMES DAILY PRN
COMMUNITY

## 2021-09-21 RX ORDER — METOPROLOL TARTRATE 25 MG/1
25 TABLET, FILM COATED ORAL 2 TIMES DAILY
COMMUNITY

## 2021-09-21 RX ORDER — ASPIRIN 81 MG/1
81 TABLET, CHEWABLE ORAL DAILY
COMMUNITY

## 2021-09-21 RX ORDER — SPIRONOLACTONE AND HYDROCHLOROTHIAZIDE 25; 25 MG/1; MG/1
0.5 TABLET ORAL DAILY
COMMUNITY

## 2021-09-21 RX ORDER — IBUPROFEN 200 MG
200-400 TABLET ORAL EVERY 4 HOURS PRN
COMMUNITY

## 2021-09-21 ASSESSMENT — MIFFLIN-ST. JEOR: SCORE: 1419.71

## 2021-09-21 NOTE — LETTER
9/21/2021        RE: Diane Urbina  [address] [place] [address]        [facility]    Facility:  [facility] ([facility]) [[id number]]  Code Status: DNR      CHIEF COMPLAINT/REASON FOR VISIT:  Chief Complaint   Patient presents with     Hospital F/U       HPI:   Diane is a 89 year old female who was recently admitted to the hospital on 9/13/2021.  She has a history of Parkinson's disease and on 913 she was admitted with urosepsis and necrotizing pancreatitis.  Lactate was 8.0 which responded well to fluid resuscitation with 4 L normal saline in the ED.  CT the abdomen and pelvis did find cholelithiasis but no signs of cholecystitis.  She did have necrotizing pancreatitis and also Klebsiella UTI and went septic shock.  She completed 7-day course for pansensitive Klebsiella.  Unclear etiology why she had a necrotizing pancreatitis and she have acute kidney injury with kidney function did normalize with IV fluids.  Family after treatment family indicated in the hospital the patient was close to the baseline.  She did have volume overload at this time and improved with gentle diuresis.  She is is requiring intermittent O2 when sleeping.  Nocturnal hypoxia.  She is continuing O2 at night.  She is DNR/DNI if she is treated appropriate transferred here to the TCU at [facility] in stable condition.    Patient seen in her chair comfortably.  She seems very frail but does not appear to be in acute distress.  She claims she is feeling much better since her hospitalization has no pain issues.  She is urinating without difficulty and there is been no fevers chills nausea vomiting.  There may be some cognitive issues here but unknown secondary to she was just hospitalized.    Past Medical History:  Past Medical History:   Diagnosis Date     Parkinson disease (H)            Surgical History:  Past Surgical History:   Procedure Laterality Date     CATARACT EXTRACTION Right       HYSTERECTOMY       JOINT REPLACEMENT Left 07/12/2016    TRINY     PARTIAL HIP ARTHROPLASTY Right 10/12/2016    Procedure: HIP FRACTURE BIPOLAR / UNIPOLAR;  Surgeon: Daryn Anand MD;  Location: [facility] Main OR;  Service:      REPAIR BLADDER         Family History:   Family History   Problem Relation Age of Onset     Cancer Mother      Cancer Father      Lung Cancer Mother      Prostate Cancer Father      Breast Cancer Sister      Lung Cancer Brother      Melanoma Son      Lung Cancer Brother      Lung Cancer Brother      Lung Cancer Sister        Social History:    Social History     Socioeconomic History     Marital status:      Spouse name: Not on file     Number of children: Not on file     Years of education: Not on file     Highest education level: Not on file   Occupational History     Not on file   Tobacco Use     Smoking status: Never Smoker     Smokeless tobacco: Never Used   Substance and Sexual Activity     Alcohol use: Never     Drug use: Not on file     Sexual activity: Not on file   Other Topics Concern     Parent/sibling w/ CABG, MI or angioplasty before 65F 55M? Not Asked   Social History Narrative     Not on file     Social Determinants of Health     Financial Resource Strain:      Difficulty of Paying Living Expenses:    Food Insecurity:      Worried About Running Out of Food in the Last Year:      Ran Out of Food in the Last Year:    Transportation Needs:      Lack of Transportation (Medical):      Lack of Transportation (Non-Medical):    Physical Activity:      Days of Exercise per Week:      Minutes of Exercise per Session:    Stress:      Feeling of Stress :    Social Connections:      Frequency of Communication with Friends and Family:      Frequency of Social Gatherings with Friends and Family:      Attends [employer] Services:      Active Member of Clubs or Organizations:      Attends Club or Organization Meetings:      Marital Status:    Intimate Partner Violence:      Fear of  Current or Ex-Partner:      Emotionally Abused:      Physically Abused:      Sexually Abused:        REVIEW OF SYSTEM: Patient denies any pain.  She denies any fevers chills nausea vomiting diarrhea change in vision hearing taste or smell weakness one-sided of the chest pain or shortness of breath.  She denies any current shortness stool polyphagia polydipsia polyuria depression or anxiety and the remainder the review of systems is negative.      PHYSICAL EXAM: Patient is alert pleasant sitting in her chair comfortably does not appear to be in acute distress.  Head was normocephalic and atraumatic sclera conjunctiva sclera gross is moist nose incision heart sounds are regular.  Lungs show diminished inspiratory volume but no crackles or rales were heard at this time.  Abdomen was obese nontender.  Dull to percussion.  Bowel sounds are positive in all 4 quadrants.  Extremities showed 1-2+ pitting edema bilateral.  Patient was moving all 4 extremities on issue neurologic exam was baseline and nonfocal and affect was pleasant.    Vital; blood pressure 130/68    Pulse of 72    Temperature is 98    Respirations 18    O2 sats 93%.        LABS:       ASSESSMENT:    Encounter Diagnoses   Name Primary?     Necrotizing pancreatitis Yes     Acute kidney injury (H)      Septic shock (H)      Hypervolemia, unspecified hypervolemia type      Metabolic encephalopathy         PLAN: At this time amylase and lipase to be done Thursday a.m. and basic metabolic profile be done Thursday a.m.  Also LFTs to be done Thursday secondary to cholelithiasis and we will get labs from the hospital and download him into the point-and-click care for my review.    Weights to be done daily there was a mention of ascites and there and her abdomen was dull to percussion.  Weights to be done daily I will be notified of any 2 pound weight change in abdominal girth will be measured Monday Wednesday Friday secondary to ascites.    She will undergo physical  occupational therapy as ordered and no other changes to care plan at this time.  Care plan was reviewed and is appropriate.        Electronically signed by: DEBORA COTTRELL DO        Sincerely,        DEBORA COTTRELL DO

## 2021-09-22 ENCOUNTER — LAB REQUISITION (OUTPATIENT)
Dept: LAB | Facility: CLINIC | Age: 86
End: 2021-09-22
Payer: COMMERCIAL

## 2021-09-22 DIAGNOSIS — K80.80 OTHER CHOLELITHIASIS WITHOUT OBSTRUCTION: ICD-10-CM

## 2021-09-22 PROCEDURE — 36415 COLL VENOUS BLD VENIPUNCTURE: CPT | Mod: ORL | Performed by: NURSE PRACTITIONER

## 2021-09-22 PROCEDURE — P9604 ONE-WAY ALLOW PRORATED TRIP: HCPCS | Mod: ORL | Performed by: NURSE PRACTITIONER

## 2021-09-22 PROCEDURE — 86481 TB AG RESPONSE T-CELL SUSP: CPT | Mod: ORL | Performed by: NURSE PRACTITIONER

## 2021-09-23 ENCOUNTER — TRANSITIONAL CARE UNIT VISIT (OUTPATIENT)
Dept: GERIATRICS | Facility: CLINIC | Age: 86
End: 2021-09-23
Payer: COMMERCIAL

## 2021-09-23 VITALS
OXYGEN SATURATION: 97 % | WEIGHT: 204.7 LBS | TEMPERATURE: 97.2 F | BODY MASS INDEX: 32.13 KG/M2 | DIASTOLIC BLOOD PRESSURE: 59 MMHG | SYSTOLIC BLOOD PRESSURE: 138 MMHG | RESPIRATION RATE: 16 BRPM | HEIGHT: 67 IN | HEART RATE: 82 BPM

## 2021-09-23 DIAGNOSIS — G93.41 METABOLIC ENCEPHALOPATHY: ICD-10-CM

## 2021-09-23 DIAGNOSIS — N17.9 ACUTE KIDNEY INJURY (H): ICD-10-CM

## 2021-09-23 DIAGNOSIS — A41.9 SEPTIC SHOCK (H): ICD-10-CM

## 2021-09-23 DIAGNOSIS — E87.70 HYPERVOLEMIA, UNSPECIFIED HYPERVOLEMIA TYPE: ICD-10-CM

## 2021-09-23 DIAGNOSIS — K85.91 NECROTIZING PANCREATITIS: Primary | ICD-10-CM

## 2021-09-23 DIAGNOSIS — R65.21 SEPTIC SHOCK (H): ICD-10-CM

## 2021-09-23 LAB
ALBUMIN SERPL-MCNC: 1.8 G/DL (ref 3.5–5)
ALP SERPL-CCNC: 76 U/L (ref 45–120)
ALT SERPL W P-5'-P-CCNC: <9 U/L (ref 0–45)
AMYLASE SERPL-CCNC: 30 U/L (ref 5–120)
ANION GAP SERPL CALCULATED.3IONS-SCNC: 9 MMOL/L (ref 5–18)
AST SERPL W P-5'-P-CCNC: 23 U/L (ref 0–40)
BILIRUB DIRECT SERPL-MCNC: 0.3 MG/DL
BILIRUB SERPL-MCNC: 0.8 MG/DL (ref 0–1)
BUN SERPL-MCNC: 13 MG/DL (ref 8–28)
CALCIUM SERPL-MCNC: 8.1 MG/DL (ref 8.5–10.5)
CHLORIDE BLD-SCNC: 99 MMOL/L (ref 98–107)
CO2 SERPL-SCNC: 28 MMOL/L (ref 22–31)
CREAT SERPL-MCNC: 0.56 MG/DL (ref 0.6–1.1)
GAMMA INTERFERON BACKGROUND BLD IA-ACNC: 0.09 IU/ML
GFR SERPL CREATININE-BSD FRML MDRD: 83 ML/MIN/1.73M2
GLUCOSE BLD-MCNC: 86 MG/DL (ref 70–125)
LIPASE SERPL-CCNC: 67 U/L (ref 0–52)
M TB IFN-G BLD-IMP: NEGATIVE
M TB IFN-G CD4+ BCKGRND COR BLD-ACNC: 3.11 IU/ML
MITOGEN IGNF BCKGRD COR BLD-ACNC: 0 IU/ML
MITOGEN IGNF BCKGRD COR BLD-ACNC: 0.01 IU/ML
POTASSIUM BLD-SCNC: 3.5 MMOL/L (ref 3.5–5)
PROT SERPL-MCNC: 5.3 G/DL (ref 6–8)
QUANTIFERON MITOGEN: 3.2 IU/ML
QUANTIFERON NIL TUBE: 0.09 IU/ML
QUANTIFERON TB1 TUBE: 0.1 IU/ML
QUANTIFERON TB2 TUBE: 0.09
SODIUM SERPL-SCNC: 136 MMOL/L (ref 136–145)

## 2021-09-23 PROCEDURE — 36415 COLL VENOUS BLD VENIPUNCTURE: CPT | Mod: ORL | Performed by: FAMILY MEDICINE

## 2021-09-23 PROCEDURE — P9603 ONE-WAY ALLOW PRORATED MILES: HCPCS | Mod: ORL | Performed by: FAMILY MEDICINE

## 2021-09-23 PROCEDURE — 99309 SBSQ NF CARE MODERATE MDM 30: CPT | Performed by: FAMILY MEDICINE

## 2021-09-23 PROCEDURE — 80053 COMPREHEN METABOLIC PANEL: CPT | Mod: ORL | Performed by: FAMILY MEDICINE

## 2021-09-23 PROCEDURE — 82248 BILIRUBIN DIRECT: CPT | Mod: ORL | Performed by: FAMILY MEDICINE

## 2021-09-23 PROCEDURE — 83690 ASSAY OF LIPASE: CPT | Mod: ORL | Performed by: FAMILY MEDICINE

## 2021-09-23 PROCEDURE — 82150 ASSAY OF AMYLASE: CPT | Mod: ORL | Performed by: FAMILY MEDICINE

## 2021-09-23 ASSESSMENT — MIFFLIN-ST. JEOR: SCORE: 1386.14

## 2021-09-23 NOTE — PROGRESS NOTES
[facility] GERIATRIC SERVICES    Facility:  [facility] ([facility]) [[id number]]  Code Status: DNR      CHIEF COMPLAINT/REASON FOR VISIT:  Chief Complaint   Patient presents with     RECHECK       HISTORY:      HPI: Diane is a 89 year old female who resides here at the [facility].  She has a history of Parkinson disease and was admitted to the hospital and had necrotizing pancreatitis and sepsis.  She is treated appropriately antibiotic lactate was 8.0.  She did have some fluid resuscitation with 4 L of normal saline in the emergency department.  CT the abdomen pelvis found cholelithiasis but no signs of cholecystitis.  She did have necrotizing pancreatitis and Klebsiella UTI.  I do have pending labs for today.  There is acute kidney injury and not it did normalize with IV fluids.  However since she has been here and I do not know how accurate this is but weight has been down about 8 pounds.  She is currently on spironolactone/hydrochlorothiazide 25/25 mg.  She has 0.5 tablets by mouth 1 time a day.  Pressure is normotensive yesterday at 138/59.  And blood pressure is pending for today.    Patient is lying in bed comfortably.  She says she feels somewhat normal but is unclear.  I am concerned about the weight loss he does have lower extremity edema and I am going to check with physical therapy as her progress today.  We do have pending labs today from her pancreatitis in the hospital so amylase lipase liver function test.  She did have some right-sided abdominal pain at this time and I do palpate a hardness and fullness in there.  However patient does indicate that she feels she is improving slowly and denies any other acute new issues.    Past Medical History:   Diagnosis Date     Parkinson disease (H)              Family History   Problem Relation Age of Onset     Cancer Mother      Cancer Father      Lung Cancer Mother      Prostate Cancer Father      Breast Cancer Sister      Lung Cancer Brother       Melanoma Son      Lung Cancer Brother      Lung Cancer Brother      Lung Cancer Sister      Social History     Socioeconomic History     Marital status:      Spouse name: Not on file     Number of children: Not on file     Years of education: Not on file     Highest education level: Not on file   Occupational History     Not on file   Tobacco Use     Smoking status: Never Smoker     Smokeless tobacco: Never Used   Substance and Sexual Activity     Alcohol use: Never     Drug use: Not on file     Sexual activity: Not on file   Other Topics Concern     Parent/sibling w/ CABG, MI or angioplasty before 65F 55M? Not Asked   Social History Narrative     Not on file     Social Determinants of Health     Financial Resource Strain:      Difficulty of Paying Living Expenses:    Food Insecurity:      Worried About Running Out of Food in the Last Year:      Ran Out of Food in the Last Year:    Transportation Needs:      Lack of Transportation (Medical):      Lack of Transportation (Non-Medical):    Physical Activity:      Days of Exercise per Week:      Minutes of Exercise per Session:    Stress:      Feeling of Stress :    Social Connections:      Frequency of Communication with Friends and Family:      Frequency of Social Gatherings with Friends and Family:      Attends [employer]ondo[employer] Services:      Active Member of Clubs or Organizations:      Attends Club or Organization Meetings:      Marital Status:    Intimate Partner Violence:      Fear of Current or Ex-Partner:      Emotionally Abused:      Physically Abused:      Sexually Abused:          REVIEW OF SYSTEM: Patient denies any pain fevers chills nausea vomiting diarrhea change in vision hearing taste or smell weakness one-sided chest pain.  She still has occasional shortness of breath but she feels that is improving.  She is urinating without difficulty at this time and has not had a bowel movement in 2 days.  The remainder review of systems is negative.      PHYSICAL  EXAM: Patient is alert pleasant does not appear to be in acute distress.  Head was normocephalic and atraumatic sclera conjunctive is clear oromucosa was moist nose no discharge.  Heart sounds are regular at this time lungs are clear to auscultation with decreased inspiratory volume but no crackles rales or wheezes heard.  Abdomen was tense and right upper quadrant.  With likely palpation of hepatomegaly with the liver was 2 to 3 fingerbreadths below the costal margin.  Extremities showed 3-4+ pitting edema bilateral.        LABS: Weight is gone from 212.1 pounds to 204.7 pounds.    It will; pressure 138/59    Pulse 82    Temperature 97.2    Respiration 16    O2 sats 97%.    LFTs, amylase, lipase, basic metabolic profile are pending for today.      ASSESSMENT:   Encounter Diagnoses   Name Primary?     Necrotizing pancreatitis Yes     Septic shock (H)      Acute kidney injury (H)      Metabolic encephalopathy      Hypervolemia, unspecified hypervolemia type         PLAN: Plan at this time abdominal x-ray flat and upright secondary to ileus no bowel movement x2 days.  Bowel sounds were's very minimally positive at this time she could be developing ileus.    She did have blood drawn today Khushbu is not in but however I will say that I did write in my orders to make sure I am notified the lipase amylase LFTs and basic metabolic profile which was drawn this a.m. per orders.    Monitor for bowel movements and notify provider if no bowel movement by 4:00 this afternoon.    Secondary to edema put OSEI hose on bilateral on in the a.m. off in p.m.  And patient will have her weight checked daily.    I will continue to monitor above medical problems and no other changes to care plan at this time.        Electronically signed by: DEBORA COTTRELL DO

## 2021-09-23 NOTE — LETTER
9/23/2021        RE: Diane Urbina  [address] [place] [address]        [facility]    Facility:  [facility] ([facility]) [[id number]]  Code Status: DNR      CHIEF COMPLAINT/REASON FOR VISIT:  Chief Complaint   Patient presents with     RECHECK       HISTORY:      HPI: Diane is a 89 year old female who resides here at the [facility].  She has a history of Parkinson disease and was admitted to the hospital and had necrotizing pancreatitis and sepsis.  She is treated appropriately antibiotic lactate was 8.0.  She did have some fluid resuscitation with 4 L of normal saline in the emergency department.  CT the abdomen pelvis found cholelithiasis but no signs of cholecystitis.  She did have necrotizing pancreatitis and Klebsiella UTI.  I do have pending labs for today.  There is acute kidney injury and not it did normalize with IV fluids.  However since she has been here and I do not know how accurate this is but weight has been down about 8 pounds.  She is currently on spironolactone/hydrochlorothiazide 25/25 mg.  She has 0.5 tablets by mouth 1 time a day.  Pressure is normotensive yesterday at 138/59.  And blood pressure is pending for today.    Patient is lying in bed comfortably.  She says she feels somewhat normal but is unclear.  I am concerned about the weight loss he does have lower extremity edema and I am going to check with physical therapy as her progress today.  We do have pending labs today from her pancreatitis in the hospital so amylase lipase liver function test.  She did have some right-sided abdominal pain at this time and I do palpate a hardness and fullness in there.  However patient does indicate that she feels she is improving slowly and denies any other acute new issues.    Past Medical History:   Diagnosis Date     Parkinson disease (H)              Family History   Problem Relation Age of Onset     Cancer Mother      Cancer Father      Lung  Cancer Mother      Prostate Cancer Father      Breast Cancer Sister      Lung Cancer Brother      Melanoma Son      Lung Cancer Brother      Lung Cancer Brother      Lung Cancer Sister      Social History     Socioeconomic History     Marital status:      Spouse name: Not on file     Number of children: Not on file     Years of education: Not on file     Highest education level: Not on file   Occupational History     Not on file   Tobacco Use     Smoking status: Never Smoker     Smokeless tobacco: Never Used   Substance and Sexual Activity     Alcohol use: Never     Drug use: Not on file     Sexual activity: Not on file   Other Topics Concern     Parent/sibling w/ CABG, MI or angioplasty before 65F 55M? Not Asked   Social History Narrative     Not on file     Social Determinants of Health     Financial Resource Strain:      Difficulty of Paying Living Expenses:    Food Insecurity:      Worried About Running Out of Food in the Last Year:      Ran Out of Food in the Last Year:    Transportation Needs:      Lack of Transportation (Medical):      Lack of Transportation (Non-Medical):    Physical Activity:      Days of Exercise per Week:      Minutes of Exercise per Session:    Stress:      Feeling of Stress :    Social Connections:      Frequency of Communication with Friends and Family:      Frequency of Social Gatherings with Friends and Family:      Attends [employer] Services:      Active Member of Clubs or Organizations:      Attends Club or Organization Meetings:      Marital Status:    Intimate Partner Violence:      Fear of Current or Ex-Partner:      Emotionally Abused:      Physically Abused:      Sexually Abused:          REVIEW OF SYSTEM: Patient denies any pain fevers chills nausea vomiting diarrhea change in vision hearing taste or smell weakness one-sided chest pain.  She still has occasional shortness of breath but she feels that is improving.  She is urinating without difficulty at this time and  has not had a bowel movement in 2 days.  The remainder review of systems is negative.      PHYSICAL EXAM: Patient is alert pleasant does not appear to be in acute distress.  Head was normocephalic and atraumatic sclera conjunctive is clear oromucosa was moist nose no discharge.  Heart sounds are regular at this time lungs are clear to auscultation with decreased inspiratory volume but no crackles rales or wheezes heard.  Abdomen was tense and right upper quadrant.  With likely palpation of hepatomegaly with the liver was 2 to 3 fingerbreadths below the costal margin.  Extremities showed 3-4+ pitting edema bilateral.        LABS: Weight is gone from 212.1 pounds to 204.7 pounds.    It will; pressure 138/59    Pulse 82    Temperature 97.2    Respiration 16    O2 sats 97%.    LFTs, amylase, lipase, basic metabolic profile are pending for today.      ASSESSMENT:   Encounter Diagnoses   Name Primary?     Necrotizing pancreatitis Yes     Septic shock (H)      Acute kidney injury (H)      Metabolic encephalopathy      Hypervolemia, unspecified hypervolemia type         PLAN: Plan at this time abdominal x-ray flat and upright secondary to ileus no bowel movement x2 days.  Bowel sounds were's very minimally positive at this time she could be developing ileus.    She did have blood drawn today Khushbu is not in but however I will say that I did write in my orders to make sure I am notified the lipase amylase LFTs and basic metabolic profile which was drawn this a.m. per orders.    Monitor for bowel movements and notify provider if no bowel movement by 4:00 this afternoon.    Secondary to edema put OSEI hose on bilateral on in the a.m. off in p.m.  And patient will have her weight checked daily.    I will continue to monitor above medical problems and no other changes to care plan at this time.        Electronically signed by: DEBORA COTTRELL DO        Sincerely,        DEBORA COTTRELL, DO

## 2021-09-28 ENCOUNTER — TRANSITIONAL CARE UNIT VISIT (OUTPATIENT)
Dept: GERIATRICS | Facility: CLINIC | Age: 86
End: 2021-09-28
Payer: COMMERCIAL

## 2021-09-28 VITALS
BODY MASS INDEX: 31.69 KG/M2 | RESPIRATION RATE: 16 BRPM | WEIGHT: 201.9 LBS | TEMPERATURE: 97.5 F | SYSTOLIC BLOOD PRESSURE: 115 MMHG | HEART RATE: 77 BPM | HEIGHT: 67 IN | OXYGEN SATURATION: 98 % | DIASTOLIC BLOOD PRESSURE: 58 MMHG

## 2021-09-28 DIAGNOSIS — N17.9 ACUTE KIDNEY INJURY (H): ICD-10-CM

## 2021-09-28 DIAGNOSIS — E87.70 HYPERVOLEMIA, UNSPECIFIED HYPERVOLEMIA TYPE: ICD-10-CM

## 2021-09-28 DIAGNOSIS — K85.91 NECROTIZING PANCREATITIS: Primary | ICD-10-CM

## 2021-09-28 DIAGNOSIS — G93.41 METABOLIC ENCEPHALOPATHY: ICD-10-CM

## 2021-09-28 DIAGNOSIS — A41.9 SEPTIC SHOCK (H): ICD-10-CM

## 2021-09-28 DIAGNOSIS — R65.21 SEPTIC SHOCK (H): ICD-10-CM

## 2021-09-28 PROCEDURE — 99309 SBSQ NF CARE MODERATE MDM 30: CPT | Performed by: FAMILY MEDICINE

## 2021-09-28 ASSESSMENT — MIFFLIN-ST. JEOR: SCORE: 1373.44

## 2021-09-28 NOTE — PROGRESS NOTES
[facility] GERIATRIC SERVICES    Facility:  [facility] ([facility]) [[id number]]  Code Status: DNR      CHIEF COMPLAINT/REASON FOR VISIT:  Chief Complaint   Patient presents with     RECHECK       HISTORY:      HPI: Diane is a 89 year old female who resides here at the [facility] undergoing physical and occupational therapy secondary to sepsis and necrotizing pancreatitis which she is already been treated for urinary tract infection with sepsis and likely septic shock.  CT the abdomen should did show cholelithiasis with no signs of cholecystitis.  She did have necrotizing pancreatitis but we have been monitoring her pancreatic enzymes and they have improving.  She also acute kidney injury and not a concern at this time where basic metabolic profile rechecked here was pretty much normal.  She is DNR/DNI at this time.    Patient is feeling good today she is not a very good historian and always says she feels well but does look like she is in pain she does have some left pain in the hip but always occurs or when she sits too long.  She is having trouble with physical occupational therapy and she is ongoing at this time.  She is also on 2 L of oxygen which I am going to titrate her down to 1.5 L.    Past Medical History:   Diagnosis Date     Parkinson disease (H)              Family History   Problem Relation Age of Onset     Cancer Mother      Cancer Father      Lung Cancer Mother      Prostate Cancer Father      Breast Cancer Sister      Lung Cancer Brother      Melanoma Son      Lung Cancer Brother      Lung Cancer Brother      Lung Cancer Sister      Social History     Socioeconomic History     Marital status:      Spouse name: Not on file     Number of children: Not on file     Years of education: Not on file     Highest education level: Not on file   Occupational History     Not on file   Tobacco Use     Smoking status: Never Smoker     Smokeless tobacco: Never Used   Substance and  Sexual Activity     Alcohol use: Never     Drug use: Not on file     Sexual activity: Not on file   Other Topics Concern     Parent/sibling w/ CABG, MI or angioplasty before 65F 55M? Not Asked   Social History Narrative     Not on file     Social Determinants of Health     Financial Resource Strain:      Difficulty of Paying Living Expenses:    Food Insecurity:      Worried About Running Out of Food in the Last Year:      Ran Out of Food in the Last Year:    Transportation Needs:      Lack of Transportation (Medical):      Lack of Transportation (Non-Medical):    Physical Activity:      Days of Exercise per Week:      Minutes of Exercise per Session:    Stress:      Feeling of Stress :    Social Connections:      Frequency of Communication with Friends and Family:      Frequency of Social Gatherings with Friends and Family:      Attends Yarsani Services:      Active Member of Clubs or Organizations:      Attends Club or Organization Meetings:      Marital Status:    Intimate Partner Violence:      Fear of Current or Ex-Partner:      Emotionally Abused:      Physically Abused:      Sexually Abused:          REVIEW OF SYSTEM: Usual left leg pain is a complaint of pain however no other signs of pain.  She denies any fever chills nausea vomit diarrhea change in vision hearing taste or smell weakness one-sided chest pain shortness of breath.  She does does hurt to urinate at times but she is moving her bowels well without any issues.      PHYSICAL EXAM: Patient is alert pleasant does not appear to be in acute distress head is normocephalic and atraumatic sclera conjunctive is clear heart sounds are regular lungs are clear abdomen soft nontender.  A bit obese.  Extremities did show 3-4+ pitting edema bilateral.  And weights have gone down from 212 down to 201.  Neurologic seems nonfocal and affect is pleasant.        LABS: On 9/23/2021 sodium was 136, potassium 3.5, chloride 99, CO2 is 28, anion gap was 9, BUN was 13,  creatinine 0.56, calcium was 8.1, GFR was 83.  Total bilirubin 0.8, direct bilirubin 0.3, total protein 5.3, albumin is 1.8, alk phosphatase 76, AST 23, ALT T was less than 9, lipase was 67 which is slightly elevated amylase was 30.    Vitals; blood pressure 109/54    Pulse of 77    Temperature is 97.5    Respiration 16    O2 sats 99%.      ASSESSMENT:   Encounter Diagnoses   Name Primary?     Necrotizing pancreatitis Yes     Hypervolemia, unspecified hypervolemia type      Acute kidney injury (H)      Septic shock (H)      Metabolic encephalopathy         PLAN: Plan at this time UA UC be done secondary some dysuria and propensity for urinary tract infection and sepsis.    Amylase and lipase were done Thursday secondary to pancreatitis and we will give trial of O2 at 1.5 L today monitor O2 sats.    Discussed case with family who was present in the room during my interview and examination and they are agreement with plan at this time.        Electronically signed by: DEBORA COTTRELL DO

## 2021-09-28 NOTE — LETTER
9/28/2021        RE: Diane Urbina  [address] [place] [address]         HEALTH GERIATRIC SERVICES    Facility:  [facility] ([facility]) [[id number]]  Code Status: DNR      CHIEF COMPLAINT/REASON FOR VISIT:  Chief Complaint   Patient presents with     RECHECK       HISTORY:      HPI: Diane is a 89 year old female who [facility] undergoing physical and occupational therapy secondary to sepsis and necrotizing pancreatitis which she is already been treated for urinary tract infection with sepsis and likely septic shock.  CT the abdomen should did show cholelithiasis with no signs of cholecystitis.  She did have necrotizing pancreatitis but we have been monitoring her pancreatic enzymes and they have improving.  She also acute kidney injury and not a concern at this time where basic metabolic profile rechecked here was pretty much normal.  She is DNR/DNI at this time.    Patient is feeling good today she is not a very good historian and always says she feels well but does look like she is in pain she does have some left pain in the hip but always occurs or when she sits too long.  She is having trouble with physical occupational therapy and she is ongoing at this time.  She is also on 2 L of oxygen which I am going to titrate her down to 1.5 L.    Past Medical History:   Diagnosis Date     Parkinson disease (H)              Family History   Problem Relation Age of Onset     Cancer Mother      Cancer Father      Lung Cancer Mother      Prostate Cancer Father      Breast Cancer Sister      Lung Cancer Brother      Melanoma Son      Lung Cancer Brother      Lung Cancer Brother      Lung Cancer Sister      Social History     Socioeconomic History     Marital status:      Spouse name: Not on file     Number of children: Not on file     Years of education: Not on file     Highest education level: Not on file   Occupational History     Not on file    Tobacco Use     Smoking status: Never Smoker     Smokeless tobacco: Never Used   Substance and Sexual Activity     Alcohol use: Never     Drug use: Not on file     Sexual activity: Not on file   Other Topics Concern     Parent/sibling w/ CABG, MI or angioplasty before 65F 55M? Not Asked   Social History Narrative     Not on file     Social Determinants of Health     Financial Resource Strain:      Difficulty of Paying Living Expenses:    Food Insecurity:      Worried About Running Out of Food in the Last Year:      Ran Out of Food in the Last Year:    Transportation Needs:      Lack of Transportation (Medical):      Lack of Transportation (Non-Medical):    Physical Activity:      Days of Exercise per Week:      Minutes of Exercise per Session:    Stress:      Feeling of Stress :    Social Connections:      Frequency of Communication with Friends and Family:      Frequency of Social Gatherings with Friends and Family:      Attends Temple Services:      Active Member of Clubs or Organizations:      Attends Club or Organization Meetings:      Marital Status:    Intimate Partner Violence:      Fear of Current or Ex-Partner:      Emotionally Abused:      Physically Abused:      Sexually Abused:          REVIEW OF SYSTEM: Usual left leg pain is a complaint of pain however no other signs of pain.  She denies any fever chills nausea vomit diarrhea change in vision hearing taste or smell weakness one-sided chest pain shortness of breath.  She does does hurt to urinate at times but she is moving her bowels well without any issues.      PHYSICAL EXAM: Patient is alert pleasant does not appear to be in acute distress head is normocephalic and atraumatic sclera conjunctive is clear heart sounds are regular lungs are clear abdomen soft nontender.  A bit obese.  Extremities did show 3-4+ pitting edema bilateral.  And weights have gone down from 212 down to 201.  Neurologic seems nonfocal and affect is pleasant.        LABS:  On 9/23/2021 sodium was 136, potassium 3.5, chloride 99, CO2 is 28, anion gap was 9, BUN was 13, creatinine 0.56, calcium was 8.1, GFR was 83.  Total bilirubin 0.8, direct bilirubin 0.3, total protein 5.3, albumin is 1.8, alk phosphatase 76, AST 23, ALT T was less than 9, lipase was 67 which is slightly elevated amylase was 30.    Vitals; blood pressure 109/54    Pulse of 77    Temperature is 97.5    Respiration 16    O2 sats 99%.      ASSESSMENT:   Encounter Diagnoses   Name Primary?     Necrotizing pancreatitis Yes     Hypervolemia, unspecified hypervolemia type      Acute kidney injury (H)      Septic shock (H)      Metabolic encephalopathy         PLAN: Plan at this time UA UC be done secondary some dysuria and propensity for urinary tract infection and sepsis.    Amylase and lipase were done Thursday secondary to pancreatitis and we will give trial of O2 at 1.5 L today monitor O2 sats.    Discussed case with family who was present in the room during my interview and examination and they are agreement with plan at this time.        Electronically signed by: DEBORA COTTRELL DO        Sincerely,        DEBORA COTTRELL DO

## 2021-09-30 ENCOUNTER — TRANSITIONAL CARE UNIT VISIT (OUTPATIENT)
Dept: GERIATRICS | Facility: CLINIC | Age: 86
End: 2021-09-30
Payer: COMMERCIAL

## 2021-09-30 ENCOUNTER — LAB REQUISITION (OUTPATIENT)
Dept: LAB | Facility: CLINIC | Age: 86
End: 2021-09-30
Payer: COMMERCIAL

## 2021-09-30 VITALS
RESPIRATION RATE: 18 BRPM | BODY MASS INDEX: 31.08 KG/M2 | SYSTOLIC BLOOD PRESSURE: 135 MMHG | WEIGHT: 198 LBS | HEIGHT: 67 IN | TEMPERATURE: 97.6 F | DIASTOLIC BLOOD PRESSURE: 57 MMHG | HEART RATE: 92 BPM | OXYGEN SATURATION: 91 %

## 2021-09-30 DIAGNOSIS — K85.91 NECROTIZING PANCREATITIS: Primary | ICD-10-CM

## 2021-09-30 DIAGNOSIS — A41.9 SEPTIC SHOCK (H): ICD-10-CM

## 2021-09-30 DIAGNOSIS — G93.41 METABOLIC ENCEPHALOPATHY: ICD-10-CM

## 2021-09-30 DIAGNOSIS — N17.9 ACUTE KIDNEY INJURY (H): ICD-10-CM

## 2021-09-30 DIAGNOSIS — G92.9 TOXIC ENCEPHALOPATHY: ICD-10-CM

## 2021-09-30 DIAGNOSIS — R65.21 SEPTIC SHOCK (H): ICD-10-CM

## 2021-09-30 DIAGNOSIS — E87.70 HYPERVOLEMIA, UNSPECIFIED HYPERVOLEMIA TYPE: ICD-10-CM

## 2021-09-30 PROCEDURE — 99309 SBSQ NF CARE MODERATE MDM 30: CPT | Performed by: FAMILY MEDICINE

## 2021-09-30 ASSESSMENT — MIFFLIN-ST. JEOR: SCORE: 1355.75

## 2021-09-30 NOTE — LETTER
9/30/2021        RE: Diane Urbina  [address] [place] [address]        [facility]    Facility:  [facility] ([facility]) [[id number]]  Code Status: DNR      CHIEF COMPLAINT/REASON FOR VISIT:  Chief Complaint   Patient presents with     RECHECK     TCU F/U       HISTORY:      HPI: Diane is a 89 year old female who resides here at the [facility] undergoing physical and Occupational Therapy. She was recently treated for urinary tract infection with sepsis and likely septic shock. She did not show any cholelithiasis when no signs of cholecystitis. She did have necrotizing pancreatitis secondary to sepsis has improved. We are awaiting those labs today she is DNR/DNI at this time.    Main concern today is her peritoneal area. ARB on her sacrum coccyx she is excoriating wound and nurse wound specialist did see her today. We did discuss his case thoroughly and decided on basilar ointment and she does have a dressing on that because it was not bleeding. She also has yeast infection in her inguinal area and it does burn. She does deny any other pain fevers chills nausea vomiting diarrhea change in vision hearing taste or smell weakness one-sided chest pain shortness of breath.    Past Medical History:   Diagnosis Date     Parkinson disease (H)              Family History   Problem Relation Age of Onset     Cancer Mother      Cancer Father      Lung Cancer Mother      Prostate Cancer Father      Breast Cancer Sister      Lung Cancer Brother      Melanoma Son      Lung Cancer Brother      Lung Cancer Brother      Lung Cancer Sister      Social History     Socioeconomic History     Marital status:      Spouse name: Not on file     Number of children: Not on file     Years of education: Not on file     Highest education level: Not on file   Occupational History     Not on file   Tobacco Use     Smoking status: Never Smoker     Smokeless tobacco: Never Used    Substance and Sexual Activity     Alcohol use: Never     Drug use: Not on file     Sexual activity: Not on file   Other Topics Concern     Parent/sibling w/ CABG, MI or angioplasty before 65F 55M? Not Asked   Social History Narrative     Not on file     Social Determinants of Health     Financial Resource Strain:      Difficulty of Paying Living Expenses:    Food Insecurity:      Worried About Running Out of Food in the Last Year:      Ran Out of Food in the Last Year:    Transportation Needs:      Lack of Transportation (Medical):      Lack of Transportation (Non-Medical):    Physical Activity:      Days of Exercise per Week:      Minutes of Exercise per Session:    Stress:      Feeling of Stress :    Social Connections:      Frequency of Communication with Friends and Family:      Frequency of Social Gatherings with Friends and Family:      Attends [employer] Services:      Active Member of Clubs or Organizations:      Attends Club or Organization Meetings:      Marital Status:    Intimate Partner Violence:      Fear of Current or Ex-Partner:      Emotionally Abused:      Physically Abused:      Sexually Abused:          REVIEW OF SYSTEM: As above the remainder the review of systems is negative.      PHYSICAL EXAM: Patient is alert pleasant does not appear to be in acute distress lying in bed comfortably and does have pain in her area. Skin shows excoriation on the sacrum coccyx area on the buttocks which is chronic covered with a dressing at this time. I did discuss with the wound nurse the did observe her today. She has reddened area consistent with fungal infection. Heart sounds are regular at this time lungs are clear to station extremities do show lymphedema with trace pitting edema neurologic seems nonfocal and affect is pleasant. Lungs were clear.        LABS: On 923 lipase was 67 which is still slightly elevated and amylase was 30.  Albumin is 1.8, total protein 5.3, direct bilirubin 0.3 and bilirubin was  0.8.  Calcium was 8.1, sodium was 136, potassium 3.5, chloride 99, CO2 is 28, anion gap was 9, BUN was 13.      ASSESSMENT:   Encounter Diagnoses   Name Primary?     Necrotizing pancreatitis Yes     Acute kidney injury (H)      Metabolic encephalopathy      Hypervolemia, unspecified hypervolemia type      Septic shock (H)         PLAN: This time we use basilar ointment to the coccyx wound change daily. Position change every 2 hours to offload. Monitor patient for stooling and changing clean right away immediately as I did write orders for.    Nystatin powder to groin area 3 times a day which is every 8 hours x10 days and as needed. Check amylase and lipase today stat if not already done.    Weight is down to 98.6 on 920 she was 212. So basic metabolic profile will be done tomorrow. She is on spironolactone hydrochlorothiazide at this time and would recommend holding if her basic metabolic profile comes back showing dehydration and volume depletion. Blood pressures have been pretty much normotensive.        Electronically signed by: DEBORA COTTRELL DO        Sincerely,        DEBORA COTTRELL DO

## 2021-09-30 NOTE — PROGRESS NOTES
[facility] GERIATRIC SERVICES    Facility:  [facility] ([facility]) [[id number]]  Code Status: DNR      CHIEF COMPLAINT/REASON FOR VISIT:  Chief Complaint   Patient presents with     RECHECK     TCU F/U       HISTORY:      HPI: Diane is a 89 year old female who resides here at the [facility] TCU undergoing physical and Occupational Therapy. She was recently treated for urinary tract infection with sepsis and likely septic shock. She did not show any cholelithiasis when no signs of cholecystitis. She did have necrotizing pancreatitis secondary to sepsis has improved. We are awaiting those labs today she is DNR/DNI at this time.    Main concern today is her peritoneal area. ARB on her sacrum coccyx she is excoriating wound and nurse wound specialist did see her today. We did discuss his case thoroughly and decided on basilar ointment and she does have a dressing on that because it was not bleeding. She also has yeast infection in her inguinal area and it does burn. She does deny any other pain fevers chills nausea vomiting diarrhea change in vision hearing taste or smell weakness one-sided chest pain shortness of breath.    Past Medical History:   Diagnosis Date     Parkinson disease (H)              Family History   Problem Relation Age of Onset     Cancer Mother      Cancer Father      Lung Cancer Mother      Prostate Cancer Father      Breast Cancer Sister      Lung Cancer Brother      Melanoma Son      Lung Cancer Brother      Lung Cancer Brother      Lung Cancer Sister      Social History     Socioeconomic History     Marital status:      Spouse name: Not on file     Number of children: Not on file     Years of education: Not on file     Highest education level: Not on file   Occupational History     Not on file   Tobacco Use     Smoking status: Never Smoker     Smokeless tobacco: Never Used   Substance and Sexual Activity     Alcohol use: Never     Drug use: Not on file     Sexual activity:  Not on file   Other Topics Concern     Parent/sibling w/ CABG, MI or angioplasty before 65F 55M? Not Asked   Social History Narrative     Not on file     Social Determinants of Health     Financial Resource Strain:      Difficulty of Paying Living Expenses:    Food Insecurity:      Worried About Running Out of Food in the Last Year:      Ran Out of Food in the Last Year:    Transportation Needs:      Lack of Transportation (Medical):      Lack of Transportation (Non-Medical):    Physical Activity:      Days of Exercise per Week:      Minutes of Exercise per Session:    Stress:      Feeling of Stress :    Social Connections:      Frequency of Communication with Friends and Family:      Frequency of Social Gatherings with Friends and Family:      Attends Confucianism Services:      Active Member of Clubs or Organizations:      Attends Club or Organization Meetings:      Marital Status:    Intimate Partner Violence:      Fear of Current or Ex-Partner:      Emotionally Abused:      Physically Abused:      Sexually Abused:          REVIEW OF SYSTEM: As above the remainder the review of systems is negative.      PHYSICAL EXAM: Patient is alert pleasant does not appear to be in acute distress lying in bed comfortably and does have pain in her area. Skin shows excoriation on the sacrum coccyx area on the buttocks which is chronic covered with a dressing at this time. I did discuss with the wound nurse the did observe her today. She has reddened area consistent with fungal infection. Heart sounds are regular at this time lungs are clear to station extremities do show lymphedema with trace pitting edema neurologic seems nonfocal and affect is pleasant. Lungs were clear.        LABS: On 923 lipase was 67 which is still slightly elevated and amylase was 30.  Albumin is 1.8, total protein 5.3, direct bilirubin 0.3 and bilirubin was 0.8.  Calcium was 8.1, sodium was 136, potassium 3.5, chloride 99, CO2 is 28, anion gap was 9, BUN  was 13.      ASSESSMENT:   Encounter Diagnoses   Name Primary?     Necrotizing pancreatitis Yes     Acute kidney injury (H)      Metabolic encephalopathy      Hypervolemia, unspecified hypervolemia type      Septic shock (H)         PLAN: This time we use basilar ointment to the coccyx wound change daily. Position change every 2 hours to offload. Monitor patient for stooling and changing clean right away immediately as I did write orders for.    Nystatin powder to groin area 3 times a day which is every 8 hours x10 days and as needed. Check amylase and lipase today stat if not already done.    Weight is down to 98.6 on 920 she was 212. So basic metabolic profile will be done tomorrow. She is on spironolactone hydrochlorothiazide at this time and would recommend holding if her basic metabolic profile comes back showing dehydration and volume depletion. Blood pressures have been pretty much normotensive.        Electronically signed by: DEBORA COTTRELL DO

## 2021-10-01 LAB
AMYLASE SERPL-CCNC: 27 U/L (ref 5–120)
ANION GAP SERPL CALCULATED.3IONS-SCNC: 17 MMOL/L (ref 5–18)
BUN SERPL-MCNC: 14 MG/DL (ref 8–28)
CALCIUM SERPL-MCNC: 8.5 MG/DL (ref 8.5–10.5)
CHLORIDE BLD-SCNC: 104 MMOL/L (ref 98–107)
CO2 SERPL-SCNC: 21 MMOL/L (ref 22–31)
CREAT SERPL-MCNC: 0.63 MG/DL (ref 0.6–1.1)
GFR SERPL CREATININE-BSD FRML MDRD: 80 ML/MIN/1.73M2
GLUCOSE BLD-MCNC: 58 MG/DL (ref 70–125)
LIPASE SERPL-CCNC: 39 U/L (ref 0–52)
POTASSIUM BLD-SCNC: 3.7 MMOL/L (ref 3.5–5)
SODIUM SERPL-SCNC: 142 MMOL/L (ref 136–145)

## 2021-10-01 PROCEDURE — 83690 ASSAY OF LIPASE: CPT | Mod: ORL | Performed by: FAMILY MEDICINE

## 2021-10-01 PROCEDURE — 36415 COLL VENOUS BLD VENIPUNCTURE: CPT | Mod: ORL | Performed by: FAMILY MEDICINE

## 2021-10-01 PROCEDURE — 80048 BASIC METABOLIC PNL TOTAL CA: CPT | Mod: ORL | Performed by: FAMILY MEDICINE

## 2021-10-01 PROCEDURE — 82150 ASSAY OF AMYLASE: CPT | Mod: ORL | Performed by: FAMILY MEDICINE

## 2021-10-01 PROCEDURE — P9604 ONE-WAY ALLOW PRORATED TRIP: HCPCS | Mod: ORL | Performed by: FAMILY MEDICINE

## 2021-10-04 ENCOUNTER — TRANSITIONAL CARE UNIT VISIT (OUTPATIENT)
Dept: GERIATRICS | Facility: CLINIC | Age: 86
End: 2021-10-04
Payer: COMMERCIAL

## 2021-10-04 VITALS
HEART RATE: 73 BPM | DIASTOLIC BLOOD PRESSURE: 58 MMHG | BODY MASS INDEX: 29.98 KG/M2 | HEIGHT: 67 IN | OXYGEN SATURATION: 93 % | WEIGHT: 191 LBS | RESPIRATION RATE: 16 BRPM | TEMPERATURE: 97.7 F | SYSTOLIC BLOOD PRESSURE: 124 MMHG

## 2021-10-04 DIAGNOSIS — K85.91 NECROTIZING PANCREATITIS: ICD-10-CM

## 2021-10-04 DIAGNOSIS — R65.21 SEPTIC SHOCK (H): ICD-10-CM

## 2021-10-04 DIAGNOSIS — G93.41 METABOLIC ENCEPHALOPATHY: ICD-10-CM

## 2021-10-04 DIAGNOSIS — A41.9 SEPTIC SHOCK (H): ICD-10-CM

## 2021-10-04 DIAGNOSIS — L89.150 PRESSURE INJURY OF SACRAL REGION, UNSTAGEABLE (H): Primary | ICD-10-CM

## 2021-10-04 DIAGNOSIS — E87.70 HYPERVOLEMIA, UNSPECIFIED HYPERVOLEMIA TYPE: ICD-10-CM

## 2021-10-04 DIAGNOSIS — N17.9 ACUTE KIDNEY INJURY (H): ICD-10-CM

## 2021-10-04 PROBLEM — E66.9 OBESITY: Status: ACTIVE | Noted: 2021-10-04

## 2021-10-04 PROBLEM — K21.9 GASTROESOPHAGEAL REFLUX DISEASE WITHOUT ESOPHAGITIS: Status: ACTIVE | Noted: 2021-10-04

## 2021-10-04 PROCEDURE — 99309 SBSQ NF CARE MODERATE MDM 30: CPT | Performed by: FAMILY MEDICINE

## 2021-10-04 ASSESSMENT — MIFFLIN-ST. JEOR: SCORE: 1324

## 2021-10-04 NOTE — LETTER
10/4/2021        RE: Diane Urbina  [address] [place] [address]         [facility]    Facility:  [facility] ([facility]) [[id number]]  Code Status: UNKNOWN      CHIEF COMPLAINT/REASON FOR VISIT:  Chief Complaint   Patient presents with     RECHECK     TCU F/U       HISTORY:      HPI: Diane is a 89 year old female who resides here at the [facility] undergoing physical occupational therapy and medical monitoring of multiple medical issues.    1.  She was recently admitted to the hospital for sepsis and septic shock underwent antibiotics.  And there is no cholecystitis.  However she did have necrotizing pancreatitis and pancreatic enzymes both amylase and lipase has returned to normal limits.    Patient does have Parkinson's disease at this time.  And has been stable.  She is on O2 2 L at this time.  And I am going to make another attempt to turn her down to 1.5 L.  She has no signs of any infectious disease at this time.    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  There is some redness around it but it does agatha does not appear to be infected.  The excoriation has improved and we have continues in calmoseptine.  I will have wound nurse see her today and likely she want to go outpatient wound clinic for possible debridement.    Pain is better managed at this time.  And we have position changes every 2 hours.    Past Medical History:   Diagnosis Date     Parkinson disease (H)              Family History   Problem Relation Age of Onset     Cancer Mother      Cancer Father      Lung Cancer Mother      Prostate Cancer Father      Breast Cancer Sister      Lung Cancer Brother      Melanoma Son      Lung Cancer Brother      Lung Cancer Brother      Lung Cancer Sister      Social History     Socioeconomic History     Marital status:      Spouse name: None     Number of children: None      Years of education: None     Highest education level: None   Occupational History     None   Tobacco Use     Smoking status: Never Smoker     Smokeless tobacco: Never Used   Substance and Sexual Activity     Alcohol use: Never     Drug use: None     Sexual activity: None   Other Topics Concern     Parent/sibling w/ CABG, MI or angioplasty before 65F 55M? Not Asked   Social History Narrative     None     Social Determinants of Health     Financial Resource Strain:      Difficulty of Paying Living Expenses:    Food Insecurity:      Worried About Running Out of Food in the Last Year:      Ran Out of Food in the Last Year:    Transportation Needs:      Lack of Transportation (Medical):      Lack of Transportation (Non-Medical):    Physical Activity:      Days of Exercise per Week:      Minutes of Exercise per Session:    Stress:      Feeling of Stress :    Social Connections:      Frequency of Communication with Friends and Family:      Frequency of Social Gatherings with Friends and Family:      Attends Mu-ism Services:      Active Member of Clubs or Organizations:      Attends Club or Organization Meetings:      Marital Status:    Intimate Partner Violence:      Fear of Current or Ex-Partner:      Emotionally Abused:      Physically Abused:      Sexually Abused:           REVIEW OF SYSTEM: Patient claims her sacral area is sore but it is improved since last visit.  And excoriation around has improved.  The problem is with her defecation but we seem to be getting a handle on that at this time.  She has no fever chills nausea vomit diarrhea change in vision hearing taste or smell weakness one-sided chest pain shortness of breath.  She is incontinent stool and urine at this time but staff is been on top of that.    Remainder review of systems is negative.      PHYSICAL EXAM: Patient is alert pleasant lying in bed, please not appear to be in acute distress she still continues to be a Cassidy lift and continues to be  on 2 L of oxygen.  Given that her O2 sats are 94% with a respiratory rate of 16.  So we are okay to try a titration down to 1.5 L.  Head was no soft and atraumatic sclera conjunctive sclera mucosa was moist nose no discharge.  Heart sounds are regular lungs are clear to auscultation.  Abdomen was obese but nontender.  Extremities showed 3-4+ pitting edema bilateral and weights have gone down on last visit and she is stable at 191.8 from 10/3/2021.  Weight on 9/20/2021 was between 205 and 212.  Last basic metabolic profile was unremarkable.  Neuro exam is nonfocal and affect is pleasant.        LABS: Follow-up labs from 10/1/2020 was as follows amylase was 27, lipase was 39, sodium is 142, potassium 3.7, chloride 104, CO2 is 21, anion gap was 17, BUN was 14, creatinine 0.63, calcium is 8.5, glucose was 58, GFR was 80.      ASSESSMENT:   Encounter Diagnoses   Name Primary?     Pressure injury of sacral region, unstageable (H) Yes     Necrotizing pancreatitis      Metabolic encephalopathy      Acute kidney injury (H)      Hypervolemia, unspecified hypervolemia type      Septic shock (H)         PLAN: At this time wound nurse will see at this time today we may change from calmoseptine to at this time does not appear to be infected.  We will continue the cares for now at this time and continue wound cares.  She will need to see outpatient wound clinic.    I reviewed her basic metabolic profile from the first and it was all within normal limits.  CO2 was low at 21 slightly low and blood sugars were low.  We will check blood sugars a little bit more often at this time secondary to her hypoglycemia.    I will continue to monitor above medical problems and no changes to care plan at this time.        Electronically signed by: DEBORA COTTRELL DO        Sincerely,        DEBORA COTTRELL, DO

## 2021-10-04 NOTE — PROGRESS NOTES
[facility] GERIATRIC SERVICES    Facility:  [facility] ([facility]) [[id number]]  Code Status: UNKNOWN      CHIEF COMPLAINT/REASON FOR VISIT:  Chief Complaint   Patient presents with     RECHECK     TCU F/U       HISTORY:      HPI: Diane is a 89 year old female who resides here at the [facility]CU undergoing physical occupational therapy and medical monitoring of multiple medical issues.    1.  She was recently admitted to the hospital for sepsis and septic shock underwent antibiotics.  And there is no cholecystitis.  However she did have necrotizing pancreatitis and pancreatic enzymes both amylase and lipase has returned to normal limits.    Patient does have Parkinson's disease at this time.  And has been stable.  She is on O2 2 L at this time.  And I am going to make another attempt to turn her down to 1.5 L.  She has no signs of any infectious disease at this time.    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  There is some redness around it but it does agatha does not appear to be infected.  The excoriation has improved and we have continues in calmoseptine.  I will have wound nurse see her today and likely she want to go outpatient wound clinic for possible debridement.    Pain is better managed at this time.  And we have position changes every 2 hours.    Past Medical History:   Diagnosis Date     Parkinson disease (H)              Family History   Problem Relation Age of Onset     Cancer Mother      Cancer Father      Lung Cancer Mother      Prostate Cancer Father      Breast Cancer Sister      Lung Cancer Brother      Melanoma Son      Lung Cancer Brother      Lung Cancer Brother      Lung Cancer Sister      Social History     Socioeconomic History     Marital status:      Spouse name: None     Number of children: None     Years of education: None     Highest education level: None   Occupational History     None    Tobacco Use     Smoking status: Never Smoker     Smokeless tobacco: Never Used   Substance and Sexual Activity     Alcohol use: Never     Drug use: None     Sexual activity: None   Other Topics Concern     Parent/sibling w/ CABG, MI or angioplasty before 65F 55M? Not Asked   Social History Narrative     None     Social Determinants of Health     Financial Resource Strain:      Difficulty of Paying Living Expenses:    Food Insecurity:      Worried About Running Out of Food in the Last Year:      Ran Out of Food in the Last Year:    Transportation Needs:      Lack of Transportation (Medical):      Lack of Transportation (Non-Medical):    Physical Activity:      Days of Exercise per Week:      Minutes of Exercise per Session:    Stress:      Feeling of Stress :    Social Connections:      Frequency of Communication with Friends and Family:      Frequency of Social Gatherings with Friends and Family:      Attends Islam Services:      Active Member of Clubs or Organizations:      Attends Club or Organization Meetings:      Marital Status:    Intimate Partner Violence:      Fear of Current or Ex-Partner:      Emotionally Abused:      Physically Abused:      Sexually Abused:           REVIEW OF SYSTEM: Patient claims her sacral area is sore but it is improved since last visit.  And excoriation around has improved.  The problem is with her defecation but we seem to be getting a handle on that at this time.  She has no fever chills nausea vomit diarrhea change in vision hearing taste or smell weakness one-sided chest pain shortness of breath.  She is incontinent stool and urine at this time but staff is been on top of that.    Remainder review of systems is negative.      PHYSICAL EXAM: Patient is alert pleasant lying in bed, please not appear to be in acute distress she still continues to be a Cassidy lift and continues to be on 2 L of oxygen.  Given that her O2 sats are 94% with a respiratory rate of 16.  So we are  okay to try a titration down to 1.5 L.  Head was no soft and atraumatic sclera conjunctive sclera mucosa was moist nose no discharge.  Heart sounds are regular lungs are clear to auscultation.  Abdomen was obese but nontender.  Extremities showed 3-4+ pitting edema bilateral and weights have gone down on last visit and she is stable at 191.8 from 10/3/2021.  Weight on 9/20/2021 was between 205 and 212.  Last basic metabolic profile was unremarkable.  Neuro exam is nonfocal and affect is pleasant.        LABS: Follow-up labs from 10/1/2020 was as follows amylase was 27, lipase was 39, sodium is 142, potassium 3.7, chloride 104, CO2 is 21, anion gap was 17, BUN was 14, creatinine 0.63, calcium is 8.5, glucose was 58, GFR was 80.      ASSESSMENT:   Encounter Diagnoses   Name Primary?     Pressure injury of sacral region, unstageable (H) Yes     Necrotizing pancreatitis      Metabolic encephalopathy      Acute kidney injury (H)      Hypervolemia, unspecified hypervolemia type      Septic shock (H)         PLAN: At this time wound nurse will see at this time today we may change from calmoseptine to at this time does not appear to be infected.  We will continue the cares for now at this time and continue wound cares.  She will need to see outpatient wound clinic.    I reviewed her basic metabolic profile from the first and it was all within normal limits.  CO2 was low at 21 slightly low and blood sugars were low.  We will check blood sugars a little bit more often at this time secondary to her hypoglycemia.    I will continue to monitor above medical problems and no changes to care plan at this time.        Electronically signed by: DEBORA COTTRELL DO

## 2021-10-06 VITALS
SYSTOLIC BLOOD PRESSURE: 100 MMHG | OXYGEN SATURATION: 92 % | DIASTOLIC BLOOD PRESSURE: 46 MMHG | HEART RATE: 80 BPM | RESPIRATION RATE: 16 BRPM | BODY MASS INDEX: 29.35 KG/M2 | TEMPERATURE: 98.4 F | HEIGHT: 67 IN | WEIGHT: 187 LBS

## 2021-10-06 ASSESSMENT — MIFFLIN-ST. JEOR: SCORE: 1305.86

## 2021-10-07 ENCOUNTER — TRANSITIONAL CARE UNIT VISIT (OUTPATIENT)
Dept: GERIATRICS | Facility: CLINIC | Age: 86
End: 2021-10-07
Payer: COMMERCIAL

## 2021-10-07 DIAGNOSIS — A41.9 SEPTIC SHOCK (H): ICD-10-CM

## 2021-10-07 DIAGNOSIS — L89.150 PRESSURE INJURY OF SACRAL REGION, UNSTAGEABLE (H): Primary | ICD-10-CM

## 2021-10-07 DIAGNOSIS — K85.91 NECROTIZING PANCREATITIS: ICD-10-CM

## 2021-10-07 DIAGNOSIS — R65.21 SEPTIC SHOCK (H): ICD-10-CM

## 2021-10-07 DIAGNOSIS — N17.9 ACUTE KIDNEY INJURY (H): ICD-10-CM

## 2021-10-07 DIAGNOSIS — G93.41 METABOLIC ENCEPHALOPATHY: ICD-10-CM

## 2021-10-07 DIAGNOSIS — E87.70 HYPERVOLEMIA, UNSPECIFIED HYPERVOLEMIA TYPE: ICD-10-CM

## 2021-10-07 PROCEDURE — 99310 SBSQ NF CARE HIGH MDM 45: CPT | Performed by: FAMILY MEDICINE

## 2021-10-07 NOTE — LETTER
10/7/2021        RE: Diane Urbina  [address] [place] [address]        [facility]    Facility:  [facility] ([facility]) [[id number]]  Code Status: DNR      CHIEF COMPLAINT/REASON FOR VISIT:  Chief Complaint   Patient presents with     RECHECK     TCU F/U       HISTORY:      HPI: Diane is a 89 year old female who resides here at the [facility] TCU after hospitalization recently for septic shock.  She has completed antibiotics is no sign of cholecystitis at this time.  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    She does Parkinson's disease which is remained relatively stable.  She is on O2 2 L and I did on last visit attempt to wean her down to 1.5 L.  She does have stage of ulcer at this time that we keep it covered with a foam as well as position changes.  Her excoriation has improved and she continues calmoseptine.  Wound nurses seeing her and following along with us.    Patient is sitting in her chair company does not appear to be in acute distress her pain is well controlled at this time but she does get burning she did have a bowel movement last night.  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  She has no nausea vomiting diarrhea chest pain or shortness of breath.    Past Medical History:   Diagnosis Date     Parkinson disease (H)              Family History   Problem Relation Age of Onset     Cancer Mother      Cancer Father      Lung Cancer Mother      Prostate Cancer Father      Breast Cancer Sister      Lung Cancer Brother      Melanoma Son      Lung Cancer Brother      Lung Cancer Brother      Lung Cancer Sister      Social History     Socioeconomic History     Marital status:      Spouse name: Not on file     Number of children: Not on file     Years of  education: Not on file     Highest education level: Not on file   Occupational History     Not on file   Tobacco Use     Smoking status: Never Smoker     Smokeless tobacco: Never Used   Substance and Sexual Activity     Alcohol use: Never     Drug use: Not on file     Sexual activity: Not on file   Other Topics Concern     Parent/sibling w/ CABG, MI or angioplasty before 65F 55M? Not Asked   Social History Narrative     Not on file     Social Determinants of Health     Financial Resource Strain:      Difficulty of Paying Living Expenses:    Food Insecurity:      Worried About Running Out of Food in the Last Year:      Ran Out of Food in the Last Year:    Transportation Needs:      Lack of Transportation (Medical):      Lack of Transportation (Non-Medical):    Physical Activity:      Days of Exercise per Week:      Minutes of Exercise per Session:    Stress:      Feeling of Stress :    Social Connections:      Frequency of Communication with Friends and Family:      Frequency of Social Gatherings with Friends and Family:      Attends [employer] Services:      Active Member of Clubs or Organizations:      Attends Club or Organization Meetings:      Marital Status:    Intimate Partner Violence:      Fear of Current or Ex-Partner:      Emotionally Abused:      Physically Abused:      Sexually Abused:          REVIEW OF SYSTEM: Patient claims her pain is under adequate control denies any fever chills nausea vomit diarrhea change in vision hearing taste or smell weakness one-sided chest pain shortness of breath.  Denies any current shortness stool polyphagia polydipsia polyuria depression or anxiety and the main review of systems is negative.  She does not have any dizziness syncope but blood pressures are very low.      PHYSICAL EXAM: Alert pleasant sitting in chair company does not appear to be in acute distress head is normocephalic and atraumatic sclera conjunctive is clear oromucosa is moist nose at discharge heart  sounds are regular at this time lungs were clear to auscultation without any crackles rales or wheeze abdomen soft nontender bowel sounds are positive.  Extremities did show no changes in her edema at this time and weight is down to 187.6.  She is likely volume depleted.      Vital; blood pressure 90/50      LABS: On 10/1/2021 amylase is 27, lipase was 39.    Basic metabolic profile; sodium was 142, potassium 3.7, chloride 104, CO2 is 21, anion gap was 17, BUN was 14, creatinine 0.63, calcium 8.5, glucose 58, GFR was 80.  On 923 liver function tests were unremarkable with the exception of protein of 5.3 and albumin 1.8.  Alk phosphatase, AST, ALT, total bilirubin and direct bilirubin were all within normal limits.  Yesterday is 100/46 before that is 142/65.  She is asymptomatic.    Pulse is 80    Temperature is 98.4    Respiration 16    O2 sats 96% on 2 L of O2.      ASSESSMENT:   Encounter Diagnoses   Name Primary?     Pressure injury of sacral region, unstageable (H) Yes     Metabolic encephalopathy      Necrotizing pancreatitis      Acute kidney injury (H)      Septic shock (H)      Hypervolemia, unspecified hypervolemia type         PLAN: Plan at this time we will get a stat basic metabolic profile and brain natruretic peptide we will get stat labs today.  Now that she is eating she is gotten up over to see if the blood pressures change but given the fact she has a weight loss has had some dizziness and her blood pressure has been low today and yesterday likely volume depletion and dehydration.    The options I have at this time is sent to the ER which we do not want to do we could give IV fluids tonight depending on what the labs say and try to fluid resuscitate her.    I will titrate down on her oxygen at this time and wound nurse will see will investigate the wound.  She will likely need debridement at some point.    I will continue to monitor above medical problems and no other changes to care plan at this  time.  35 minutes was spent on this follow-up critical care visit with greater than 50% of time dedicated to coordination of care.  This included coordinating care to arrange for possible IV fluids to be given and this discussion took place with nurse manager.  Also explained to the patient and patient's granddaughter in the room that we need to check the kidneys first to see where she is and then will give her some IV fluids.    No other changes to care plan at this time.        Electronically signed by: DEBORA COTTRELL DO        Sincerely,        DEBORA COTTRELL DO

## 2021-10-07 NOTE — PROGRESS NOTES
[facility]    Facility:  [facility] ([facility]) [[id number]]  Code Status: DNR      CHIEF COMPLAINT/REASON FOR VISIT:  Chief Complaint   Patient presents with     RECHECK     TCU F/U       HISTORY:      HPI: Diane is a 89 year old female who resides here at the [facility] after hospitalization recently for septic shock.  She has completed antibiotics is no sign of cholecystitis at this time.  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    She does Parkinson's disease which is remained relatively stable.  She is on O2 2 L and I did on last visit attempt to wean her down to 1.5 L.  She does have stage of ulcer at this time that we keep it covered with a foam as well as position changes.  Her excoriation has improved and she continues calmoseptine.  Wound nurses seeing her and following along with us.    Patient is sitting in her chair company does not appear to be in acute distress her pain is well controlled at this time but she does get burning she did have a bowel movement last night.  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  She has no nausea vomiting diarrhea chest pain or shortness of breath.    Past Medical History:   Diagnosis Date     Parkinson disease (H)              Family History   Problem Relation Age of Onset     Cancer Mother      Cancer Father      Lung Cancer Mother      Prostate Cancer Father      Breast Cancer Sister      Lung Cancer Brother      Melanoma Son      Lung Cancer Brother      Lung Cancer Brother      Lung Cancer Sister      Social History     Socioeconomic History     Marital status:      Spouse name: Not on file     Number of children: Not on file     Years of education: Not on file     Highest education level: Not on file   Occupational History     Not on file    Tobacco Use     Smoking status: Never Smoker     Smokeless tobacco: Never Used   Substance and Sexual Activity     Alcohol use: Never     Drug use: Not on file     Sexual activity: Not on file   Other Topics Concern     Parent/sibling w/ CABG, MI or angioplasty before 65F 55M? Not Asked   Social History Narrative     Not on file     Social Determinants of Health     Financial Resource Strain:      Difficulty of Paying Living Expenses:    Food Insecurity:      Worried About Running Out of Food in the Last Year:      Ran Out of Food in the Last Year:    Transportation Needs:      Lack of Transportation (Medical):      Lack of Transportation (Non-Medical):    Physical Activity:      Days of Exercise per Week:      Minutes of Exercise per Session:    Stress:      Feeling of Stress :    Social Connections:      Frequency of Communication with Friends and Family:      Frequency of Social Gatherings with Friends and Family:      Attends [employer] Services:      Active Member of Clubs or Organizations:      Attends Club or Organization Meetings:      Marital Status:    Intimate Partner Violence:      Fear of Current or Ex-Partner:      Emotionally Abused:      Physically Abused:      Sexually Abused:          REVIEW OF SYSTEM: Patient claims her pain is under adequate control denies any fever chills nausea vomit diarrhea change in vision hearing taste or smell weakness one-sided chest pain shortness of breath.  Denies any current shortness stool polyphagia polydipsia polyuria depression or anxiety and the main review of systems is negative.  She does not have any dizziness syncope but blood pressures are very low.      PHYSICAL EXAM: Alert pleasant sitting in chair company does not appear to be in acute distress head is normocephalic and atraumatic sclera conjunctive is clear oromucosa is moist nose at discharge heart sounds are regular at this time lungs were clear to auscultation without any crackles rales or wheeze  abdomen soft nontender bowel sounds are positive.  Extremities did show no changes in her edema at this time and weight is down to 187.6.  She is likely volume depleted.      Vital; blood pressure 90/50      LABS: On 10/1/2021 amylase is 27, lipase was 39.    Basic metabolic profile; sodium was 142, potassium 3.7, chloride 104, CO2 is 21, anion gap was 17, BUN was 14, creatinine 0.63, calcium 8.5, glucose 58, GFR was 80.  On 923 liver function tests were unremarkable with the exception of protein of 5.3 and albumin 1.8.  Alk phosphatase, AST, ALT, total bilirubin and direct bilirubin were all within normal limits.  Yesterday is 100/46 before that is 142/65.  She is asymptomatic.    Pulse is 80    Temperature is 98.4    Respiration 16    O2 sats 96% on 2 L of O2.      ASSESSMENT:   Encounter Diagnoses   Name Primary?     Pressure injury of sacral region, unstageable (H) Yes     Metabolic encephalopathy      Necrotizing pancreatitis      Acute kidney injury (H)      Septic shock (H)      Hypervolemia, unspecified hypervolemia type         PLAN: Plan at this time we will get a stat basic metabolic profile and brain natruretic peptide we will get stat labs today.  Now that she is eating she is gotten up over to see if the blood pressures change but given the fact she has a weight loss has had some dizziness and her blood pressure has been low today and yesterday likely volume depletion and dehydration.    The options I have at this time is sent to the ER which we do not want to do we could give IV fluids tonight depending on what the labs say and try to fluid resuscitate her.    I will titrate down on her oxygen at this time and wound nurse will see will investigate the wound.  She will likely need debridement at some point.    I will continue to monitor above medical problems and no other changes to care plan at this time.  35 minutes was spent on this follow-up critical care visit with greater than 50% of time  dedicated to coordination of care.  This included coordinating care to arrange for possible IV fluids to be given and this discussion took place with nurse manager.  Also explained to the patient and patient's granddaughter in the room that we need to check the kidneys first to see where she is and then will give her some IV fluids.    No other changes to care plan at this time.        Electronically signed by: DEBORA COTTRELL DO

## 2021-10-16 ENCOUNTER — LAB REQUISITION (OUTPATIENT)
Dept: LAB | Facility: CLINIC | Age: 86
End: 2021-10-16
Payer: COMMERCIAL

## 2021-10-16 DIAGNOSIS — D64.89 OTHER SPECIFIED ANEMIAS: ICD-10-CM

## 2021-10-18 LAB
ERYTHROCYTE [DISTWIDTH] IN BLOOD BY AUTOMATED COUNT: 14.2 % (ref 10–15)
HCT VFR BLD AUTO: 22.1 % (ref 35–47)
HGB BLD-MCNC: 7 G/DL (ref 11.7–15.7)
MCH RBC QN AUTO: 30.7 PG (ref 26.5–33)
MCHC RBC AUTO-ENTMCNC: 31.7 G/DL (ref 31.5–36.5)
MCV RBC AUTO: 97 FL (ref 78–100)
PLATELET # BLD AUTO: 373 10E3/UL (ref 150–450)
RBC # BLD AUTO: 2.28 10E6/UL (ref 3.8–5.2)
WBC # BLD AUTO: 7.5 10E3/UL (ref 4–11)

## 2021-10-18 PROCEDURE — 85027 COMPLETE CBC AUTOMATED: CPT | Mod: ORL | Performed by: NURSE PRACTITIONER

## 2021-10-18 PROCEDURE — P9604 ONE-WAY ALLOW PRORATED TRIP: HCPCS | Mod: ORL | Performed by: NURSE PRACTITIONER

## 2021-10-18 PROCEDURE — 36415 COLL VENOUS BLD VENIPUNCTURE: CPT | Mod: ORL | Performed by: NURSE PRACTITIONER

## 2021-10-19 ENCOUNTER — LAB REQUISITION (OUTPATIENT)
Dept: LAB | Facility: CLINIC | Age: 86
End: 2021-10-19
Payer: COMMERCIAL

## 2021-10-19 DIAGNOSIS — I10 ESSENTIAL (PRIMARY) HYPERTENSION: ICD-10-CM

## 2021-10-20 LAB
ERYTHROCYTE [DISTWIDTH] IN BLOOD BY AUTOMATED COUNT: 14.1 % (ref 10–15)
HCT VFR BLD AUTO: 23.3 % (ref 35–47)
HGB BLD-MCNC: 7.3 G/DL (ref 11.7–15.7)
MCH RBC QN AUTO: 30.2 PG (ref 26.5–33)
MCHC RBC AUTO-ENTMCNC: 31.3 G/DL (ref 31.5–36.5)
MCV RBC AUTO: 96 FL (ref 78–100)
PLATELET # BLD AUTO: 383 10E3/UL (ref 150–450)
RBC # BLD AUTO: 2.42 10E6/UL (ref 3.8–5.2)
WBC # BLD AUTO: 8.2 10E3/UL (ref 4–11)

## 2021-10-20 PROCEDURE — 36415 COLL VENOUS BLD VENIPUNCTURE: CPT | Mod: ORL | Performed by: INTERNAL MEDICINE

## 2021-10-20 PROCEDURE — 85027 COMPLETE CBC AUTOMATED: CPT | Mod: ORL | Performed by: INTERNAL MEDICINE

## 2021-10-20 PROCEDURE — P9604 ONE-WAY ALLOW PRORATED TRIP: HCPCS | Mod: ORL | Performed by: INTERNAL MEDICINE

## 2021-10-21 ENCOUNTER — LAB REQUISITION (OUTPATIENT)
Dept: LAB | Facility: CLINIC | Age: 86
End: 2021-10-21
Payer: COMMERCIAL

## 2021-10-21 DIAGNOSIS — D64.89 OTHER SPECIFIED ANEMIAS: ICD-10-CM

## 2021-10-22 LAB — HGB BLD-MCNC: 7.5 G/DL (ref 11.7–15.7)

## 2021-10-22 PROCEDURE — 85018 HEMOGLOBIN: CPT | Mod: ORL | Performed by: NURSE PRACTITIONER

## 2021-10-22 PROCEDURE — P9604 ONE-WAY ALLOW PRORATED TRIP: HCPCS | Mod: ORL | Performed by: NURSE PRACTITIONER

## 2021-10-22 PROCEDURE — 36415 COLL VENOUS BLD VENIPUNCTURE: CPT | Mod: ORL | Performed by: NURSE PRACTITIONER

## 2021-10-24 ENCOUNTER — LAB REQUISITION (OUTPATIENT)
Dept: LAB | Facility: CLINIC | Age: 86
End: 2021-10-24
Payer: COMMERCIAL

## 2021-10-24 DIAGNOSIS — D64.89 OTHER SPECIFIED ANEMIAS: ICD-10-CM

## 2021-11-06 ENCOUNTER — LAB REQUISITION (OUTPATIENT)
Dept: LAB | Facility: CLINIC | Age: 86
End: 2021-11-06
Payer: COMMERCIAL

## 2021-11-06 DIAGNOSIS — D64.89 OTHER SPECIFIED ANEMIAS: ICD-10-CM

## 2021-11-08 LAB
HCT VFR BLD AUTO: 24.4 % (ref 35–47)
HGB BLD-MCNC: 7.6 G/DL (ref 11.7–15.7)

## 2021-11-08 PROCEDURE — 36415 COLL VENOUS BLD VENIPUNCTURE: CPT | Mod: ORL | Performed by: NURSE PRACTITIONER

## 2021-11-08 PROCEDURE — 85018 HEMOGLOBIN: CPT | Mod: ORL | Performed by: NURSE PRACTITIONER

## 2021-11-08 PROCEDURE — 85014 HEMATOCRIT: CPT | Mod: ORL | Performed by: NURSE PRACTITIONER

## 2021-11-08 PROCEDURE — P9604 ONE-WAY ALLOW PRORATED TRIP: HCPCS | Mod: ORL | Performed by: NURSE PRACTITIONER
